# Patient Record
Sex: MALE | Race: WHITE | NOT HISPANIC OR LATINO | Employment: FULL TIME | URBAN - METROPOLITAN AREA
[De-identification: names, ages, dates, MRNs, and addresses within clinical notes are randomized per-mention and may not be internally consistent; named-entity substitution may affect disease eponyms.]

---

## 2018-01-13 ENCOUNTER — APPOINTMENT (OUTPATIENT)
Dept: RADIOLOGY | Facility: MEDICAL CENTER | Age: 23
End: 2018-01-13
Attending: EMERGENCY MEDICINE
Payer: MEDICAID

## 2018-01-13 ENCOUNTER — HOSPITAL ENCOUNTER (EMERGENCY)
Facility: MEDICAL CENTER | Age: 23
End: 2018-01-13
Attending: EMERGENCY MEDICINE
Payer: MEDICAID

## 2018-01-13 VITALS
HEART RATE: 98 BPM | OXYGEN SATURATION: 96 % | HEIGHT: 76 IN | RESPIRATION RATE: 17 BRPM | TEMPERATURE: 97.6 F | BODY MASS INDEX: 20.83 KG/M2 | WEIGHT: 171.08 LBS | SYSTOLIC BLOOD PRESSURE: 120 MMHG | DIASTOLIC BLOOD PRESSURE: 78 MMHG

## 2018-01-13 DIAGNOSIS — S62.339A CLOSED BOXER'S FRACTURE, INITIAL ENCOUNTER: ICD-10-CM

## 2018-01-13 PROCEDURE — 99284 EMERGENCY DEPT VISIT MOD MDM: CPT

## 2018-01-13 PROCEDURE — 73130 X-RAY EXAM OF HAND: CPT | Mod: LT

## 2018-01-13 PROCEDURE — 302874 HCHG BANDAGE ACE 2 OR 3""

## 2018-01-13 PROCEDURE — 29125 APPL SHORT ARM SPLINT STATIC: CPT

## 2018-01-13 ASSESSMENT — PAIN SCALES - GENERAL
PAINLEVEL_OUTOF10: 7
PAINLEVEL_OUTOF10: 8

## 2018-01-13 NOTE — ED NOTES
Pt to triage c/o left hand hand after hitting the wall. Swelling noted. Pt advised to return to triage nurse for any changes or concerns.

## 2018-01-14 NOTE — DISCHARGE INSTRUCTIONS
Boxer's Fracture  A boxer's fracture is a break (fracture) of the bone in your hand that connects your little finger to your wrist (fifth metacarpal). This type of fracture usually happens at the end of the bone, closest to the little finger. The knuckle is often pushed down by the impact.  In some cases, only a splint or brace is needed, or you may need a cast. Casting or splinting may include taping your injured finger to the next finger (vania taping). You may need surgery to repair the fracture. This may involve the use of wires, screws, or plates to hold the bone pieces in place.   CAUSES  This injury may be caused by:   · Hitting an object with a clenched fist.  · A hard, direct hit to the hand.  · An injury that crushes the hand.  RISK FACTORS  This injury is more likely to occur if:  · You are in a fistfight.  · You have certain bone diseases.  SYMPTOMS  Symptoms of this type of fracture develop soon after the injury. Symptoms may include:  · Swelling of the hand.  · Pain.  · Pain when moving the fifth finger or touching the hand.  · Abnormal position of the finger.  · Not being able to move the finger.  · A shortened finger.  · A finger knuckle that looks sunken in.  DIAGNOSIS  This injury may be diagnosed based on your symptoms, especially if you had a recent hand injury. Your health care provider will perform a physical exam, and you may also have X-rays to confirm the diagnosis.  TREATMENT   Treatment for this injury depends on how severe it is. Possible treatments include:  · Closed reduction. If your bone is stable and can be moved back into place, you may only need to wear a cast or splint or have vania taping.  · Open reduction with internal fixation (ORIF). This may be needed if your fracture is far out of place or goes through the joint surface of the bone. This treatment involves open surgery to move your bones back into the right position. Screws, wires, or plates may be used to stabilize the  fracture.  You may need to wear a cast or a splint for several weeks. You will also need to have follow-up X-rays to make sure that the bone is healing well and staying in position. After you no longer need the cast or splint, you may need physical therapy. This will help you to regain full movement and strength in your hand.   HOME CARE INSTRUCTIONS  If You Have a Cast:  · Do not stick anything inside the cast to scratch your skin. Doing that increases your risk of infection.  · Check the skin around the cast every day. Report any concerns to your health care provider. You may put lotion on dry skin around the edges of the cast. Do not apply lotion to the skin underneath the cast.  If You Have a Splint:  · Wear it as directed by your health care provider. Remove it only as directed by your health care provider.  · Loosen the splint if your fingers become numb and tingle, or if they turn cold and blue.  Bathing  · Cover the cast or splint with a watertight plastic bag to protect it from water while you take a bath or a shower. Do not let the cast or splint get wet.  Managing Pain, Stiffness, and Swelling  · If directed, apply ice to the injured area (if you have a splint, not a cast):  ¨ Put ice in a plastic bag.  ¨ Place a towel between your skin and the bag.  ¨ Leave the ice on for 20 minutes, 2-3 times a day.  · Move your fingers often to avoid stiffness and to lessen swelling.  · Raise the injured area above the level of your heart while you are sitting or lying down.  Driving  · Do not drive or operate heavy machinery while taking pain medicine.  · Do not drive while wearing a cast or splint on a hand or foot that you use for driving.  Activity  · Return to your normal activities as directed by your health care provider. Ask your health care provider what activities are safe for you.  General Instructions  · Do not put pressure on any part of the cast or splint until it is fully hardened. This may take several  hours.  · Keep the cast or splint clean and dry.  · Do not use any tobacco products, including cigarettes, chewing tobacco, or electronic cigarettes. Tobacco can delay bone healing. If you need help quitting, ask your health care provider.  · Take medicines only as directed by your health care provider.  · Keep all follow-up visits as directed by your health care provider. This is important.  SEEK MEDICAL CARE IF:  · Your pain is getting worse.  · You have redness, swelling, or pain in the injured area.  · You have fluid, blood, or pus coming from under your cast or splint.  · You notice a bad smell coming from under your cast or splint.  · You have a fever.  · Your cast or splint feels too tight or too loose.  · You cast is coming apart.  SEEK IMMEDIATE MEDICAL CARE IF:  · You develop a rash.  · You have trouble breathing.  · Your skin or nails on your injured hand turn blue or gray even after you loosen your splint.  · Your injured hand feels cold or becomes numb even after you loosen your splint.  · You develop severe pain under the cast or in your hand.     This information is not intended to replace advice given to you by your health care provider. Make sure you discuss any questions you have with your health care provider.     Document Released: 12/18/2006 Document Revised: 05/03/2016 Document Reviewed: 10/07/2015  [employer] Interactive Patient Education ©2016 [employer]      Cast or Splint Care  Casts and splints support injured limbs and keep bones from moving while they heal.   HOME CARE  · Keep the cast or splint uncovered during the drying period.  ¨ A plaster cast can take 24 to 48 hours to dry.  ¨ A fiberglass cast will dry in less than 1 hour.  · Do not rest the cast on anything harder than a pillow for 24 hours.  · Do not put weight on your injured limb. Do not put pressure on the cast. Wait for your doctor's approval.  · Keep the cast or splint dry.  ¨ Cover the cast or splint with a plastic bag  during baths or wet weather.  ¨ If you have a cast over your chest and belly (trunk), take sponge baths until the cast is taken off.  ¨ If your cast gets wet, dry it with a towel or blow dryer. Use the cool setting on the blow dryer.  · Keep your cast or splint clean. Wash a dirty cast with a damp cloth.  · Do not put any objects under your cast or splint.  · Do not scratch the skin under the cast with an object. If itching is a problem, use a blow dryer on a cool setting over the itchy area.  · Do not trim or cut your cast.  · Do not take out the padding from inside your cast.  · Exercise your joints near the cast as told by your doctor.  · Raise (elevate) your injured limb on 1 or 2 pillows for the first 1 to 3 days.  GET HELP IF:  · Your cast or splint cracks.  · Your cast or splint is too tight or too loose.  · You itch badly under the cast.  · Your cast gets wet or has a soft spot.  · You have a bad smell coming from the cast.  · You get an object stuck under the cast.  · Your skin around the cast becomes red or sore.  · You have new or more pain after the cast is put on.  GET HELP RIGHT AWAY IF:  · You have fluid leaking through the cast.  · You cannot move your fingers or toes.  · Your fingers or toes turn blue or white or are cool, painful, or puffy (swollen).  · You have tingling or lose feeling (numbness) around the injured area.  · You have bad pain or pressure under the cast.  · You have trouble breathing or have shortness of breath.  · You have chest pain.     This information is not intended to replace advice given to you by your health care provider. Make sure you discuss any questions you have with your health care provider.     Document Released: 04/18/2012 Document Revised: 08/20/2014 Document Reviewed: 06/26/2014  [employer] Interactive Patient Education ©2016 [employer]    Please follow-up with your primary care provider for blood pressure management.

## 2018-01-14 NOTE — ED PROVIDER NOTES
ED Provider Note    Scribed for Janine Parker M.D. by Yaritza Alfaro. 1/13/2018, 4:36 PM.    Primary care provider: Pcp Pt States None  Means of arrival: Walk-in  History obtained from: Patient  History limited by: None    CHIEF COMPLAINT  Chief Complaint   Patient presents with   • Hand Pain       HPI  Alex Nation is a 22 y.o. left-handed male who presents to the Emergency Department for evaluation of left hand pain in the context of punching a concrete wall onset today. He reports associated swelling to the hand and has difficulty making a fist. He denies sensory changes. Patient admits to previously injuring his left hand by punching things.      REVIEW OF SYSTEMS  Pertinent positives include left hand pain, swelling to L hand. Pertinent negatives include no sensory changes. All other systems reviewed and negative.     PAST MEDICAL HISTORY   has a past medical history of ADD (attention deficit disorder with hyperactivity); ADHD (attention deficit hyperactivity disorder); Bipolar 2 disorder (CMS-HCC); Bipolar affective (CMS-[facility]); Depression; Hypothyroid; Hypothyroidism; Schizophrenia (CMS-[facility]); and Suicidal attempted.    SURGICAL HISTORY  patient denies any surgical history    SOCIAL HISTORY  Social History   Substance Use Topics   • Smoking status: Current Every Day Smoker     Packs/day: 0.25     Years: 6.00     Types: Cigarettes   • Smokeless tobacco: Never Used      Comment: quit 2-3 years ago.    • Alcohol use Yes      Comment: occ      History   Drug Use   • Types: Inhaled     Comment: ocass. weed       FAMILY HISTORY  History reviewed. No pertinent family history.    CURRENT MEDICATIONS  Home Medications     Reviewed by Indiana Acosta R.N. (Registered Nurse) on 01/13/18 at 3054  Med List Status: Complete   Medication Last Dose Status        Patient Vince Taking any Medications                       ALLERGIES  No Known Allergies    PHYSICAL EXAM  VITAL SIGNS: /52   Pulse (!) 118   Temp 36.3 °C  "(97.4 °F) (Temporal)   Resp 16   Ht 1.93 m (6' 4\")   Wt 77.6 kg (171 lb 1.2 oz)   SpO2 96%   BMI 20.82 kg/m²     Constitutional:  Sitting upright in bed, well-appearing, shirtless, able to answer questions  HENT: Nose is normal in appearance without rhinorrhea, external ears are normal, moist mucous membranes  Eyes: Anicteric, pupils are equal round and reactive, there is no conjunctival drainage or pallor   Neck: The trachea is midline, there is no obvious mass or meningeal signs  Cardiovascular: Equal radial pulsation, tachycardic, regular rhythm without murmurs gallops or rubs  Thorax & Lungs: Respiratory rate and effort are normal. There is normal chest excursion with respiration. No wheezes rhonchi or rales noted.  Abdomen: Abdomen is normal in appearance, normal bowel sounds, no pain with cough, nonpulsatile  Musculoskeletal: Swelling on 3rd knuckle on L hand on dorsum, difficulty making fistm  Skin: Skin intact on L hand  Neurologic:  Cranial nerves II through XII are intact there is no focal abnormality noted.  Psychiatric: Normal mood and mentation    DIAGNOSTIC STUDIES / PROCEDURES  SPLINT PLACEMENT:  The patient was placed in a boxers splint and the splint was applied by tech. Following splint application I rechecked the position and circulation and neuro function. The splint was in good position with good neurovascular function. The left hand was well immobilized.    RADIOLOGY  DX-HAND 3+ LEFT   Final Result      No evidence of acute fracture or dislocation.        The radiologist's interpretation of all radiological studies have been reviewed by me.    COURSE & MEDICAL DECISION MAKING  Nursing notes and vital signs were reviewed. (See chart for details)  The patient's records were reviewed, history was obtained from the patient;     The patient presents with L hand pain and swelling, and the differential diagnosis includes but is not limited to fracture vs. contusion.    4:36 PM Initial orders in " the Emergency Department included DX Left Hand.    ED testing reveals nondisplaced fracture on X-Ray. Discussed the results with him and informed him that a splint will be placed and given a sling for comfort. I informed patient to follow up with Ortho which patient understands and agrees to. The patient will be discharged and should return if symptoms worsen or if new symptoms arise. The patient understands and agrees to plan.     The patient was discharged home with an information sheet on Boxer's Fracture and told to return immediately for any signs or symptoms listed, but specifically if pain worsens.  The patient agreed to the discharge precautions and follow-up plan which is documented in EPIC.    The patient is referred to a primary physician for blood pressure management, diabetic screening, and for all other preventative health concerns.    DISPOSITION:  Patient will be discharged home in stable condition.    FOLLOW UP:  Daniel Keith M.D.  [address]  [id number]  [phone]    Schedule an appointment as soon as possible for a visit in 3 days  ice, NSAIDS, splint, elevation      FINAL IMPRESSION  1. Closed boxer's fracture, initial encounter          IYaritza (Justa), am scribing for, and in the presence of, Janine Parker M.D..    Electronically signed by: Yaritza Dunn), 1/13/2018    Janine BARKSDALE M.D. personally performed the services described in this documentation, as scribed by Yaritza Alfaro in my presence, and it is both accurate and complete.    The note accurately reflects work and decisions made by me.  Janine Parker  1/13/2018  7:21 PM

## 2018-01-14 NOTE — ED NOTES
Discharge instructions given to patient, a verbal understanding of all instructions was stated. Pt preferred to walk out,  VSS, all belongings accounted for.

## 2019-03-02 ENCOUNTER — HOSPITAL ENCOUNTER (EMERGENCY)
Facility: MEDICAL CENTER | Age: 24
End: 2019-03-02
Attending: EMERGENCY MEDICINE
Payer: MEDICAID

## 2019-03-02 VITALS
HEART RATE: 69 BPM | HEIGHT: 77 IN | WEIGHT: 160.94 LBS | DIASTOLIC BLOOD PRESSURE: 76 MMHG | OXYGEN SATURATION: 98 % | RESPIRATION RATE: 16 BRPM | SYSTOLIC BLOOD PRESSURE: 131 MMHG | BODY MASS INDEX: 19 KG/M2 | TEMPERATURE: 98.4 F

## 2019-03-02 DIAGNOSIS — J04.10 TRACHEITIS: ICD-10-CM

## 2019-03-02 DIAGNOSIS — R49.0 HOARSENESS: ICD-10-CM

## 2019-03-02 DIAGNOSIS — J04.0 LARYNGITIS: ICD-10-CM

## 2019-03-02 PROCEDURE — 99283 EMERGENCY DEPT VISIT LOW MDM: CPT

## 2019-03-02 RX ORDER — AZITHROMYCIN 250 MG/1
TABLET, FILM COATED ORAL
Qty: 6 TAB | Refills: 0 | Status: SHIPPED | OUTPATIENT
Start: 2019-03-02 | End: 2019-09-11

## 2019-03-02 ASSESSMENT — ENCOUNTER SYMPTOMS
SORE THROAT: 1
HEMOPTYSIS: 1
COUGH: 1
HEADACHES: 1
SHORTNESS OF BREATH: 0
SPUTUM PRODUCTION: 1

## 2019-03-02 NOTE — ED TRIAGE NOTES
Pt amb to triage.  Chief Complaint   Patient presents with   • Hoarse     x3d     Pt states he was recently sick with the flu, he feels better but is concerned his voice is hoarse.

## 2019-03-02 NOTE — ED PROVIDER NOTES
ED Provider Note    Scribed for Lux Gomez M.D. by Param Gilbert. 3/2/2019, 9:37 AM.    Primary care provider: Pcp Pt States None  Means of arrival: Walk In  History obtained from: Patient  History limited by: None    CHIEF COMPLAINT  Chief Complaint   Patient presents with   • Hoarse     x3d       HPI  Alex Ntaion is a 23 y.o. male who presents to the Emergency Department for evaluation of flu like symptoms, which have been present for the last 6 days. He reports coughing, which is sometimes brown, but also sometimes is blood red. Additionally, he has rhinorrhea, sore throat, and headaches, but denies any shortness of breath. Alex's voice is also hoarse at this time, and he has pain with his coughing. He has tried various OTC medications, which have helped alleviate his headache and eased difficulty with sleep.    REVIEW OF SYSTEMS  Review of Systems   HENT: Positive for sore throat.         Positive: Rhinorrhea; Hoarse   Respiratory: Positive for cough, hemoptysis and sputum production. Negative for shortness of breath.    Neurological: Positive for headaches.       PAST MEDICAL HISTORY   has a past medical history of ADD (attention deficit disorder with hyperactivity); ADHD (attention deficit hyperactivity disorder); Bipolar 2 disorder (HCC); Bipolar affective (HCC); Depression; Hypothyroid; Hypothyroidism; Schizophrenia (HCC); and Suicidal attempted.    SURGICAL HISTORY  patient denies any surgical history    SOCIAL HISTORY  Social History   Substance Use Topics   • Smoking status: Current Every Day Smoker     Packs/day: 0.25     Years: 6.00     Types: Cigarettes   • Smokeless tobacco: Never Used      Comment: quit 2-3 years ago.    • Alcohol use Yes      Comment: occ      History   Drug Use   • Types: Inhaled     Comment: ocass. weed       FAMILY HISTORY  Family history reviewed. Family history not pertinent.    CURRENT MEDICATIONS  Home Medications     Reviewed by Luke Walters R.N. (Registered  "Nurse) on 03/02/19 at 0930  Med List Status: <None>   Medication Last Dose Status        Patient Vince Taking any Medications                       ALLERGIES  No Known Allergies    PHYSICAL EXAM  VITAL SIGNS: /76   Pulse 69   Temp 36.9 °C (98.4 °F) (Oral)   Resp 16   Ht 1.956 m (6' 5\")   Wt 73 kg (160 lb 15 oz)   SpO2 98%   BMI 19.08 kg/m²     Constitutional:  No acute distress  HENT: Normocephalic, Moist mucous membranes  Eyes: No conjunctivitis or icterus  Neck: trachea is midline, no palpable thyroid  Lymphatic: No cervical lymphadenopathy  Cardiovascular: Regular rate and rhythm, no murmurs  Thorax & Lungs: Normal breath sounds, no rhonchi  Abdomen: Soft, Non-tender  Skin:.No rash  Back: Non-tender, no CVA tenderness  Extremities:  No edema  Vascular: Symmetric radial pulse  Neurologic: Normal gross motor      COURSE & MEDICAL DECISION MAKING  Pertinent Labs & Imaging studies reviewed. (See chart for details)    9:46 AM - Patient seen and examined at bedside. Differential diagnoses include but not limited to: Laryngitis, Tracheitis. Informed the patient he likely has a viral infection which could be influenza, given his symptomology. Patient made aware that his symptoms may take up to another week to resolve given that they have already been present for 6 days. I will prescribe him antibiotics to treat the possibility of a bacterial infection, however he was made aware that they may not have any impact on his symptoms. He understands and is comfortable with discharge.    Medical Decision Making:   Patient has a hoarseness coughing up some brown occasionally bloody sputum and has pain in the tracheal region he may have a tracheitis I am covering him with a azithromycin.  He is given return precautions and follow-up    The patient will return for new or worsening symptoms and is stable at the time of discharge.    The patient is referred to a primary physician for blood pressure management, diabetic " screening, and for all other preventative health concerns.    DISPOSITION:  Patient will be discharged home in stable condition.    FOLLOW UP:  [address] [address]  [phone]          OUTPATIENT MEDICATIONS:  Discharge Medication List as of 3/2/2019 10:02 AM      START taking these medications    Details   azithromycin (ZITHROMAX) 250 MG Tab 2 po now then 1 po q day for 4 days, Disp-6 Tab, R-0, Print Rx Paper             FINAL IMPRESSION  1. Hoarseness    2. Laryngitis    3. Tracheitis          Param BARKSDALE (Scribe), am scribing for, and in the presence of, Lux Gomez M.D..    Electronically signed by: Param Gilbert (Scribe), 3/2/2019    Lux BARKSDALE M.D. personally performed the services described in this documentation, as scribed by Param Gilbert in my presence, and it is both accurate and complete. E.    The note accurately reflects work and decisions made by me.  Lux Gomez  3/2/2019  3:28 PM

## 2019-03-02 NOTE — ED NOTES
Pt verbalizes understanding of discharge instructions and education. Pt verbalizes understanding of antibiotic education. Pt to follow up with PCP. Steady and even gait noted.

## 2019-09-11 ENCOUNTER — HOSPITAL ENCOUNTER (OUTPATIENT)
Facility: MEDICAL CENTER | Age: 24
End: 2019-09-12
Attending: EMERGENCY MEDICINE | Admitting: HOSPITALIST

## 2019-09-11 DIAGNOSIS — R41.0 DELIRIUM: ICD-10-CM

## 2019-09-11 DIAGNOSIS — T50.901A ACCIDENTAL DRUG INGESTION, INITIAL ENCOUNTER: ICD-10-CM

## 2019-09-11 DIAGNOSIS — R00.0 TACHYCARDIA: ICD-10-CM

## 2019-09-11 PROBLEM — E87.6 HYPOKALEMIA: Status: ACTIVE | Noted: 2019-09-11

## 2019-09-11 PROBLEM — T50.902A PURPOSEFUL NON-SUICIDAL DRUG INGESTION (HCC): Status: ACTIVE | Noted: 2019-09-11

## 2019-09-11 LAB
ALBUMIN SERPL BCP-MCNC: 4.1 G/DL (ref 3.2–4.9)
ALBUMIN/GLOB SERPL: 1.3 G/DL
ALP SERPL-CCNC: 39 U/L (ref 30–99)
ALT SERPL-CCNC: 12 U/L (ref 2–50)
AMPHET UR QL SCN: NEGATIVE
ANION GAP SERPL CALC-SCNC: 11 MMOL/L (ref 0–11.9)
APAP SERPL-MCNC: <10 UG/ML (ref 10–30)
APPEARANCE UR: CLEAR
AST SERPL-CCNC: 17 U/L (ref 12–45)
BARBITURATES UR QL SCN: NEGATIVE
BASOPHILS # BLD AUTO: 0.5 % (ref 0–1.8)
BASOPHILS # BLD: 0.05 K/UL (ref 0–0.12)
BENZODIAZ UR QL SCN: POSITIVE
BILIRUB SERPL-MCNC: 0.4 MG/DL (ref 0.1–1.5)
BILIRUB UR QL STRIP.AUTO: NEGATIVE
BUN SERPL-MCNC: 13 MG/DL (ref 8–22)
BZE UR QL SCN: NEGATIVE
CALCIUM SERPL-MCNC: 8.9 MG/DL (ref 8.5–10.5)
CANNABINOIDS UR QL SCN: POSITIVE
CHLORIDE SERPL-SCNC: 106 MMOL/L (ref 96–112)
CO2 SERPL-SCNC: 22 MMOL/L (ref 20–33)
COLOR UR: YELLOW
CREAT SERPL-MCNC: 0.85 MG/DL (ref 0.5–1.4)
EOSINOPHIL # BLD AUTO: 0.15 K/UL (ref 0–0.51)
EOSINOPHIL NFR BLD: 1.6 % (ref 0–6.9)
ERYTHROCYTE [DISTWIDTH] IN BLOOD BY AUTOMATED COUNT: 40 FL (ref 35.9–50)
ETHANOL BLD-MCNC: 0 G/DL
GLOBULIN SER CALC-MCNC: 3.1 G/DL (ref 1.9–3.5)
GLUCOSE SERPL-MCNC: 127 MG/DL (ref 65–99)
GLUCOSE UR STRIP.AUTO-MCNC: NEGATIVE MG/DL
HCT VFR BLD AUTO: 40.9 % (ref 42–52)
HGB BLD-MCNC: 13.5 G/DL (ref 14–18)
IMM GRANULOCYTES # BLD AUTO: 0.1 K/UL (ref 0–0.11)
IMM GRANULOCYTES NFR BLD AUTO: 1.1 % (ref 0–0.9)
KETONES UR STRIP.AUTO-MCNC: NEGATIVE MG/DL
LEUKOCYTE ESTERASE UR QL STRIP.AUTO: NEGATIVE
LYMPHOCYTES # BLD AUTO: 2.81 K/UL (ref 1–4.8)
LYMPHOCYTES NFR BLD: 29.7 % (ref 22–41)
MAGNESIUM SERPL-MCNC: 1.6 MG/DL (ref 1.5–2.5)
MCH RBC QN AUTO: 30.1 PG (ref 27–33)
MCHC RBC AUTO-ENTMCNC: 33 G/DL (ref 33.7–35.3)
MCV RBC AUTO: 91.1 FL (ref 81.4–97.8)
METHADONE UR QL SCN: NEGATIVE
MICRO URNS: NORMAL
MONOCYTES # BLD AUTO: 0.53 K/UL (ref 0–0.85)
MONOCYTES NFR BLD AUTO: 5.6 % (ref 0–13.4)
NEUTROPHILS # BLD AUTO: 5.82 K/UL (ref 1.82–7.42)
NEUTROPHILS NFR BLD: 61.5 % (ref 44–72)
NITRITE UR QL STRIP.AUTO: NEGATIVE
NRBC # BLD AUTO: 0 K/UL
NRBC BLD-RTO: 0 /100 WBC
OPIATES UR QL SCN: NEGATIVE
OXYCODONE UR QL SCN: NEGATIVE
PCP UR QL SCN: NEGATIVE
PH UR STRIP.AUTO: 7.5 [PH] (ref 5–8)
PLATELET # BLD AUTO: 263 K/UL (ref 164–446)
PMV BLD AUTO: 10.4 FL (ref 9–12.9)
POTASSIUM SERPL-SCNC: 3.5 MMOL/L (ref 3.6–5.5)
PROPOXYPH UR QL SCN: NEGATIVE
PROT SERPL-MCNC: 7.2 G/DL (ref 6–8.2)
PROT UR QL STRIP: NEGATIVE MG/DL
RBC # BLD AUTO: 4.49 M/UL (ref 4.7–6.1)
RBC UR QL AUTO: NEGATIVE
SALICYLATES SERPL-MCNC: 0 MG/DL (ref 15–25)
SODIUM SERPL-SCNC: 139 MMOL/L (ref 135–145)
SP GR UR STRIP.AUTO: 1.02
TROPONIN T SERPL-MCNC: <6 NG/L (ref 6–19)
UROBILINOGEN UR STRIP.AUTO-MCNC: 0.2 MG/DL
WBC # BLD AUTO: 9.5 K/UL (ref 4.8–10.8)

## 2019-09-11 PROCEDURE — A9270 NON-COVERED ITEM OR SERVICE: HCPCS | Performed by: HOSPITALIST

## 2019-09-11 PROCEDURE — 96374 THER/PROPH/DIAG INJ IV PUSH: CPT

## 2019-09-11 PROCEDURE — 84484 ASSAY OF TROPONIN QUANT: CPT

## 2019-09-11 PROCEDURE — 700111 HCHG RX REV CODE 636 W/ 250 OVERRIDE (IP): Performed by: EMERGENCY MEDICINE

## 2019-09-11 PROCEDURE — 700105 HCHG RX REV CODE 258: Performed by: EMERGENCY MEDICINE

## 2019-09-11 PROCEDURE — 80053 COMPREHEN METABOLIC PANEL: CPT

## 2019-09-11 PROCEDURE — 700105 HCHG RX REV CODE 258: Performed by: HOSPITALIST

## 2019-09-11 PROCEDURE — 36415 COLL VENOUS BLD VENIPUNCTURE: CPT

## 2019-09-11 PROCEDURE — 85025 COMPLETE CBC W/AUTO DIFF WBC: CPT

## 2019-09-11 PROCEDURE — G0378 HOSPITAL OBSERVATION PER HR: HCPCS

## 2019-09-11 PROCEDURE — 83735 ASSAY OF MAGNESIUM: CPT

## 2019-09-11 PROCEDURE — 99219 PR INITIAL OBSERVATION CARE,LEVL II: CPT | Performed by: HOSPITALIST

## 2019-09-11 PROCEDURE — 81003 URINALYSIS AUTO W/O SCOPE: CPT

## 2019-09-11 PROCEDURE — 80307 DRUG TEST PRSMV CHEM ANLYZR: CPT

## 2019-09-11 PROCEDURE — 700102 HCHG RX REV CODE 250 W/ 637 OVERRIDE(OP): Performed by: HOSPITALIST

## 2019-09-11 PROCEDURE — 99285 EMERGENCY DEPT VISIT HI MDM: CPT

## 2019-09-11 RX ORDER — LORAZEPAM 2 MG/ML
1 INJECTION INTRAMUSCULAR ONCE
Status: COMPLETED | OUTPATIENT
Start: 2019-09-11 | End: 2019-09-11

## 2019-09-11 RX ORDER — LORAZEPAM 1 MG/1
1 TABLET ORAL ONCE
Status: DISCONTINUED | OUTPATIENT
Start: 2019-09-11 | End: 2019-09-11

## 2019-09-11 RX ORDER — PROMETHAZINE HYDROCHLORIDE 25 MG/1
12.5-25 SUPPOSITORY RECTAL EVERY 4 HOURS PRN
Status: DISCONTINUED | OUTPATIENT
Start: 2019-09-11 | End: 2019-09-12 | Stop reason: HOSPADM

## 2019-09-11 RX ORDER — PROCHLORPERAZINE EDISYLATE 5 MG/ML
5-10 INJECTION INTRAMUSCULAR; INTRAVENOUS EVERY 4 HOURS PRN
Status: DISCONTINUED | OUTPATIENT
Start: 2019-09-11 | End: 2019-09-12 | Stop reason: HOSPADM

## 2019-09-11 RX ORDER — POLYETHYLENE GLYCOL 3350 17 G/17G
1 POWDER, FOR SOLUTION ORAL
Status: DISCONTINUED | OUTPATIENT
Start: 2019-09-11 | End: 2019-09-12 | Stop reason: HOSPADM

## 2019-09-11 RX ORDER — PROMETHAZINE HYDROCHLORIDE 25 MG/1
12.5-25 TABLET ORAL EVERY 4 HOURS PRN
Status: DISCONTINUED | OUTPATIENT
Start: 2019-09-11 | End: 2019-09-12 | Stop reason: HOSPADM

## 2019-09-11 RX ORDER — SODIUM CHLORIDE 9 MG/ML
1000 INJECTION, SOLUTION INTRAVENOUS ONCE
Status: COMPLETED | OUTPATIENT
Start: 2019-09-11 | End: 2019-09-12

## 2019-09-11 RX ORDER — AMOXICILLIN 250 MG
2 CAPSULE ORAL 2 TIMES DAILY
Status: DISCONTINUED | OUTPATIENT
Start: 2019-09-11 | End: 2019-09-12 | Stop reason: HOSPADM

## 2019-09-11 RX ORDER — ONDANSETRON 4 MG/1
4 TABLET, ORALLY DISINTEGRATING ORAL EVERY 4 HOURS PRN
Status: DISCONTINUED | OUTPATIENT
Start: 2019-09-11 | End: 2019-09-12 | Stop reason: HOSPADM

## 2019-09-11 RX ORDER — SODIUM CHLORIDE, SODIUM LACTATE, POTASSIUM CHLORIDE, CALCIUM CHLORIDE 600; 310; 30; 20 MG/100ML; MG/100ML; MG/100ML; MG/100ML
INJECTION, SOLUTION INTRAVENOUS CONTINUOUS
Status: DISCONTINUED | OUTPATIENT
Start: 2019-09-11 | End: 2019-09-12 | Stop reason: HOSPADM

## 2019-09-11 RX ORDER — ONDANSETRON 2 MG/ML
4 INJECTION INTRAMUSCULAR; INTRAVENOUS EVERY 4 HOURS PRN
Status: DISCONTINUED | OUTPATIENT
Start: 2019-09-11 | End: 2019-09-12 | Stop reason: HOSPADM

## 2019-09-11 RX ORDER — POTASSIUM CHLORIDE 20 MEQ/1
40 TABLET, EXTENDED RELEASE ORAL ONCE
Status: COMPLETED | OUTPATIENT
Start: 2019-09-11 | End: 2019-09-11

## 2019-09-11 RX ORDER — ACETAMINOPHEN 325 MG/1
650 TABLET ORAL EVERY 6 HOURS PRN
Status: DISCONTINUED | OUTPATIENT
Start: 2019-09-11 | End: 2019-09-12 | Stop reason: HOSPADM

## 2019-09-11 RX ORDER — BISACODYL 10 MG
10 SUPPOSITORY, RECTAL RECTAL
Status: DISCONTINUED | OUTPATIENT
Start: 2019-09-11 | End: 2019-09-12 | Stop reason: HOSPADM

## 2019-09-11 RX ADMIN — POTASSIUM CHLORIDE 40 MEQ: 20 TABLET, EXTENDED RELEASE ORAL at 20:08

## 2019-09-11 RX ADMIN — SODIUM CHLORIDE, POTASSIUM CHLORIDE, SODIUM LACTATE AND CALCIUM CHLORIDE: 600; 310; 30; 20 INJECTION, SOLUTION INTRAVENOUS at 20:09

## 2019-09-11 RX ADMIN — SODIUM CHLORIDE 1000 ML: 9 INJECTION, SOLUTION INTRAVENOUS at 11:59

## 2019-09-11 RX ADMIN — LORAZEPAM 1 MG: 2 INJECTION INTRAMUSCULAR; INTRAVENOUS at 11:59

## 2019-09-11 SDOH — HEALTH STABILITY: MENTAL HEALTH: HOW OFTEN DO YOU HAVE A DRINK CONTAINING ALCOHOL?: MONTHLY OR LESS

## 2019-09-11 SDOH — HEALTH STABILITY: MENTAL HEALTH: HOW MANY STANDARD DRINKS CONTAINING ALCOHOL DO YOU HAVE ON A TYPICAL DAY?: 1 OR 2

## 2019-09-11 SDOH — HEALTH STABILITY: MENTAL HEALTH: HOW OFTEN DO YOU HAVE 6 OR MORE DRINKS ON ONE OCCASION?: NEVER

## 2019-09-11 ASSESSMENT — PATIENT HEALTH QUESTIONNAIRE - PHQ9
SUM OF ALL RESPONSES TO PHQ9 QUESTIONS 1 AND 2: 0
1. LITTLE INTEREST OR PLEASURE IN DOING THINGS: NOT AT ALL
2. FEELING DOWN, DEPRESSED, IRRITABLE, OR HOPELESS: NOT AT ALL

## 2019-09-11 ASSESSMENT — LIFESTYLE VARIABLES
AVERAGE NUMBER OF DAYS PER WEEK YOU HAVE A DRINK CONTAINING ALCOHOL: 1
EVER FELT BAD OR GUILTY ABOUT YOUR DRINKING: NO
DOES PATIENT WANT TO STOP DRINKING: NO
HOW MANY TIMES IN THE PAST YEAR HAVE YOU HAD 5 OR MORE DRINKS IN A DAY: 0
EVER_SMOKED: YES
TOTAL SCORE: 0
HAVE YOU EVER FELT YOU SHOULD CUT DOWN ON YOUR DRINKING: NO
ON A TYPICAL DAY WHEN YOU DRINK ALCOHOL HOW MANY DRINKS DO YOU HAVE: 0
HAVE PEOPLE ANNOYED YOU BY CRITICIZING YOUR DRINKING: NO
DO YOU DRINK ALCOHOL: NO
EVER_SMOKED: YES
ALCOHOL_USE: YES
EVER HAD A DRINK FIRST THING IN THE MORNING TO STEADY YOUR NERVES TO GET RID OF A HANGOVER: NO
CONSUMPTION TOTAL: NEGATIVE
TOTAL SCORE: 0
TOTAL SCORE: 0

## 2019-09-11 ASSESSMENT — COGNITIVE AND FUNCTIONAL STATUS - GENERAL
MOBILITY SCORE: 23
DAILY ACTIVITIY SCORE: 23
SUGGESTED CMS G CODE MODIFIER MOBILITY: CI
TOILETING: A LITTLE
WALKING IN HOSPITAL ROOM: A LITTLE
SUGGESTED CMS G CODE MODIFIER DAILY ACTIVITY: CI

## 2019-09-11 ASSESSMENT — ENCOUNTER SYMPTOMS
ABDOMINAL PAIN: 0
SHORTNESS OF BREATH: 0
DIZZINESS: 0
HALLUCINATIONS: 1
PALPITATIONS: 0

## 2019-09-11 NOTE — ED TRIAGE NOTES
Pt BIB EMS from outside his work.  Approx hour an a half ago pt ingested a scone that was made by a friend that was laced with something, reports that his friend normally does put marijuana but unsure of what is in it this time.  Pt reports that he ate the whole scone and only one of them.  Pt anxious and restless with hallucinations.  Pt was given 1mg of Versed and 4 mg of zofran by EMS.  ERP to see.

## 2019-09-11 NOTE — ED NOTES
Pt resting in gurney with eyes closed.  Pt wakes at times, stating random statements.  Pt re-oriented.  Call light in reach.  Will continue to monitor.

## 2019-09-11 NOTE — ED PROVIDER NOTES
ED Provider Note    Scribed for Sanjuanita Ruiz M.D. by Shahla Davidson. 9/11/2019, 11:45 AM.    Primary care provider: None noted  Means of arrival: Walk-in  History obtained from: Patient  History limited by: altered mental status    CHIEF COMPLAINT  Chief Complaint   Patient presents with   • Drug Ingestion   • Tachycardia       HPI  Alex Nation is a 24 y.o. male who presents to the Emergency Department for evaluation after drug ingestion onset earlier today. Per EMS, the patient received a scone made by a friend that the patient believes had marijuana. While at work, the patient began to feel disoriented, sleepy and nauseous. Associated symptoms of visual and auditory hallucinations. Denies suicidal ideation or other over the counter ingestion. Negative recreational drug use but states that he occasionally drinks alcohol. Past medical history includes bipolar disease and ADD.     Further HPI is limited secondary to altered mental status    REVIEW OF SYSTEMS  Pertinent positives include drug ingestion, disorientation, sleepiness, nausea, auditory and visual hallucinations. Pertinent negatives include no suicidal ideation, headache, vomiting.    Further ROS is limited secondary to altered mental status    PAST MEDICAL HISTORY   has a past medical history of ADD (attention deficit disorder with hyperactivity), ADHD (attention deficit hyperactivity disorder), Bipolar 2 disorder (HCC), Bipolar affective (HCC), Depression, Hypothyroid, Hypothyroidism, Schizophrenia (HCC), and Suicidal attempted.    SURGICAL HISTORY  patient denies any surgical history    SOCIAL HISTORY  Social History     Tobacco Use   • Smoking status: Current Some Day Smoker     Packs/day: 0.25     Years: 6.00     Pack years: 1.50     Types: Cigarettes   • Smokeless tobacco: Never Used   Substance Use Topics   • Alcohol use: Yes     Comment: occ   • Drug use: Yes     Types: Inhaled     Comment: ocass. weed      Social History     Substance and Sexual  "Activity   Drug Use Yes   • Types: Inhaled    Comment: ocass. weed       FAMILY HISTORY  History reviewed. No pertinent family history.    CURRENT MEDICATIONS  The patient denies taking any daily medications    ALLERGIES  No Known Allergies    PHYSICAL EXAM  VITAL SIGNS: /54   Pulse (!) 135   Temp 38 °C (100.4 °F) (Temporal)   Resp 20   Ht 1.93 m (6' 4\")   Wt 84.8 kg (187 lb)   SpO2 95%   BMI 22.76 kg/m²     Constitutional: Altered appearing  male. Able to answer some questions. Hallucinating. Restless in bed. Well developed  HENT: Normocephalic, No evidence of head trauma, Bilateral external ears normal, Oropharynx moist, No oral exudates, Nose normal.   Eyes: PERRL, EOMI, Conjunctiva normal, No discharge. Pupils are 3 mm and symmetrical bilateral  Neck: Normal range of motion, No tenderness, Supple, No stridor. No meningeal signs  Cardiovascular: Tachycardic heart rate, Normal rhythm  Thorax & Lungs: Normal breath sounds, No respiratory distress   Abdomen: Benign abdominal exam, no guarding no rebound, no tenderness, no distention  Skin: Warm, Dry, No erythema, No rash.   Back: No tenderness, No CVA tenderness.   Extremities: Intact distal pulses, No edema, No tenderness   Neurologic:  altered but can answer questions appropriately. Moves all extremities. No facial asymmetry. Normal sensory function.   Psychiatric: Hallucinating. Restless in bed. Gestures and shakes with left hand                                       DIAGNOSTIC STUDIES / PROCEDURES\    LABS  Results for orders placed or performed during the hospital encounter of 09/11/19   CBC WITH DIFFERENTIAL   Result Value Ref Range    WBC 9.5 4.8 - 10.8 K/uL    RBC 4.49 (L) 4.70 - 6.10 M/uL    Hemoglobin 13.5 (L) 14.0 - 18.0 g/dL    Hematocrit 40.9 (L) 42.0 - 52.0 %    MCV 91.1 81.4 - 97.8 fL    MCH 30.1 27.0 - 33.0 pg    MCHC 33.0 (L) 33.7 - 35.3 g/dL    RDW 40.0 35.9 - 50.0 fL    Platelet Count 263 164 - 446 K/uL    MPV 10.4 9.0 - 12.9 " fL    Neutrophils-Polys 61.50 44.00 - 72.00 %    Lymphocytes 29.70 22.00 - 41.00 %    Monocytes 5.60 0.00 - 13.40 %    Eosinophils 1.60 0.00 - 6.90 %    Basophils 0.50 0.00 - 1.80 %    Immature Granulocytes 1.10 (H) 0.00 - 0.90 %    Nucleated RBC 0.00 /100 WBC    Neutrophils (Absolute) 5.82 1.82 - 7.42 K/uL    Lymphs (Absolute) 2.81 1.00 - 4.80 K/uL    Monos (Absolute) 0.53 0.00 - 0.85 K/uL    Eos (Absolute) 0.15 0.00 - 0.51 K/uL    Baso (Absolute) 0.05 0.00 - 0.12 K/uL    Immature Granulocytes (abs) 0.10 0.00 - 0.11 K/uL    NRBC (Absolute) 0.00 K/uL   TROPONIN   Result Value Ref Range    Troponin T <6 6 - 19 ng/L       All labs reviewed by me.    COURSE & MEDICAL DECISION MAKING  Nursing notes, VS, PMSFHx reviewed in chart.     Patient presents to the emergency department with altered mental status.  EMS reports patient ate a scone and began being altered after eating this gone.  Patient denies feeling ill yesterday.  History is somewhat limited as he is difficult to keep on task.  He is able to answer some questions and then goes off on tangents about his hallucinations at other times.  He states he is seeing and hearing things.  Patient does not complain of any pain.  He is tachycardic here.  He does have some mild tremors and is difficult to get him to sit still.  There is no evidence of head trauma.  Temperature upon arrival was 100.4.  This is a temporal temperature.  He does not have any meningeal signs.  I think it would be unusual for meningitis to occur so quickly as he was normal when he first arrived at work a few hours ago.  Patient denies any any history of alcohol abuse and I do not suspect DTs at this time.  Review of the old chart does not show evidence of meth abuse in the past.  I do not see any evidence of head trauma states just his altered mental status is due to an intracranial process.    11:45 AM Patient seen and examined at bedside. The patient presents with drug ingestion and the  differential diagnosis includes but is not limited to dehydration, drug ingestion, electrolyte abnormality. There are no physical exam findings to suggest trauma. There is no history of alcohol abuse to suggest DTs. Ordered for Troponin, UA, Salicylate level, Acetaminophen, CMP, CBC with differential, blood alcohol, urine drug screen to evaluate. Patient was treated with Ativan 1 mg and IV fluids 1,000 mL for his symptoms.    Patient's laboratory studies show a normal white count without evidence of bandemia.  Patient does not have evidence of a gap acidosis.  CO2 is 22.  There is no evidence of significant dehydration.  Tylenol and aspirin levels were negative.  Alcohol level was also 0.  Urine is still pending at this time.  He received a liter of fluid and Ativan.  This did help his heart rate as it is now in the 80s.  He is also less tremulous and resting comfortably.  It does not appear that the patient is currently taking any medications.  However he does have a history of psychiatric issues.  And therefore serotonin syndrome is also on the differential.  He does seem to have a slight tremor on exam but I do not feel any rigidity.    HYDRATION: Based on the patient's presentation of drug ingestion and altered mental status the patient was given IV fluids. IV Hydration was used because oral hydration was not adequate alone. Upon recheck following hydration, the patient was improved..    Patient's temperature is normal.  He does seem to more clear in his thinking.  However he still remains altered and I believe he requires admission for further evaluation and monitoring.  Urine is still pending at this time.  Patient was able to give a sample however lab is down at this time.  He is less tremulous at this time.  He does not complain of any headache.    I discussed the case with Dr. Gonzalez who will admit the patient.    FINAL IMPRESSION  1. Accidental drug ingestion, initial encounter    2. Tachycardia    3.  Delirium          APOLONIA, Shahla Davidson (Scribe), am scribing for, and in the presence of, Sanjuanita Ruiz M.D..    Electronically signed by: Shahla Davidson ([email or web address]), 9/11/2019    ISanjuanita M.D. personally performed the services described in this documentation, as scribed by Shahla Davidson in my presence, and it is both accurate and complete.    C    The note accurately reflects work and decisions made by me.  Sanjuanita Ruiz  9/11/2019  3:31 PM

## 2019-09-12 VITALS
TEMPERATURE: 98.7 F | WEIGHT: 187 LBS | HEART RATE: 64 BPM | SYSTOLIC BLOOD PRESSURE: 104 MMHG | BODY MASS INDEX: 22.77 KG/M2 | DIASTOLIC BLOOD PRESSURE: 57 MMHG | HEIGHT: 76 IN | RESPIRATION RATE: 17 BRPM | OXYGEN SATURATION: 96 %

## 2019-09-12 PROBLEM — E87.6 HYPOKALEMIA: Status: RESOLVED | Noted: 2019-09-11 | Resolved: 2019-09-12

## 2019-09-12 PROCEDURE — 99217 PR OBSERVATION CARE DISCHARGE: CPT | Performed by: INTERNAL MEDICINE

## 2019-09-12 PROCEDURE — G0378 HOSPITAL OBSERVATION PER HR: HCPCS

## 2019-09-12 PROCEDURE — 700111 HCHG RX REV CODE 636 W/ 250 OVERRIDE (IP): Performed by: HOSPITALIST

## 2019-09-12 PROCEDURE — 90686 IIV4 VACC NO PRSV 0.5 ML IM: CPT | Performed by: INTERNAL MEDICINE

## 2019-09-12 PROCEDURE — 700111 HCHG RX REV CODE 636 W/ 250 OVERRIDE (IP): Performed by: INTERNAL MEDICINE

## 2019-09-12 PROCEDURE — 700102 HCHG RX REV CODE 250 W/ 637 OVERRIDE(OP): Performed by: HOSPITALIST

## 2019-09-12 PROCEDURE — 700105 HCHG RX REV CODE 258: Performed by: HOSPITALIST

## 2019-09-12 PROCEDURE — 96372 THER/PROPH/DIAG INJ SC/IM: CPT

## 2019-09-12 PROCEDURE — A9270 NON-COVERED ITEM OR SERVICE: HCPCS | Performed by: HOSPITALIST

## 2019-09-12 PROCEDURE — 90471 IMMUNIZATION ADMIN: CPT

## 2019-09-12 RX ADMIN — INFLUENZA A VIRUS A/BRISBANE/02/2018 IVR-190 (H1N1) ANTIGEN (FORMALDEHYDE INACTIVATED), INFLUENZA A VIRUS A/KANSAS/14/2017 X-327 (H3N2) ANTIGEN (FORMALDEHYDE INACTIVATED), INFLUENZA B VIRUS B/PHUKET/3073/2013 ANTIGEN (FORMALDEHYDE INACTIVATED), AND INFLUENZA B VIRUS B/MARYLAND/15/2016 BX-69A ANTIGEN (FORMALDEHYDE INACTIVATED) 0.5 ML: 15; 15; 15; 15 INJECTION, SUSPENSION INTRAMUSCULAR at 16:10

## 2019-09-12 RX ADMIN — SODIUM CHLORIDE, POTASSIUM CHLORIDE, SODIUM LACTATE AND CALCIUM CHLORIDE: 600; 310; 30; 20 INJECTION, SOLUTION INTRAVENOUS at 04:54

## 2019-09-12 RX ADMIN — ENOXAPARIN SODIUM 40 MG: 100 INJECTION SUBCUTANEOUS at 04:55

## 2019-09-12 RX ADMIN — ACETAMINOPHEN 650 MG: 325 TABLET, FILM COATED ORAL at 09:39

## 2019-09-12 ASSESSMENT — PATIENT HEALTH QUESTIONNAIRE - PHQ9
2. FEELING DOWN, DEPRESSED, IRRITABLE, OR HOPELESS: NOT AT ALL
SUM OF ALL RESPONSES TO PHQ9 QUESTIONS 1 AND 2: 0
1. LITTLE INTEREST OR PLEASURE IN DOING THINGS: NOT AT ALL

## 2019-09-12 NOTE — ED NOTES
Pt resting in [facility] requesting food, pt informed awaiting for orders from admitting MD.  Pt has no complaints at this time and watching TV.  Pt also spoke to girlfriend on phone.

## 2019-09-12 NOTE — CARE PLAN
Problem: Communication  Goal: The ability to communicate needs accurately and effectively will improve  Outcome: MET     Problem: Safety  Goal: Will remain free from injury  Outcome: MET  Goal: Will remain free from falls  Outcome: MET     Problem: Infection  Goal: Will remain free from infection  Outcome: MET     Problem: Venous Thromboembolism (VTW)/Deep Vein Thrombosis (DVT) Prevention:  Goal: Patient will participate in Venous Thrombosis (VTE)/Deep Vein Thrombosis (DVT)Prevention Measures  Outcome: MET     Problem: Bowel/Gastric:  Goal: Normal bowel function is maintained or improved  Outcome: MET  Goal: Will not experience complications related to bowel motility  Outcome: MET     Problem: Knowledge Deficit  Goal: Knowledge of disease process/condition, treatment plan, diagnostic tests, and medications will improve  Outcome: MET  Goal: Knowledge of the prescribed therapeutic regimen will improve  Outcome: MET     Problem: Discharge Barriers/Planning  Goal: Patient's continuum of care needs will be met  Outcome: MET     Problem: Psychosocial Needs:  Goal: Level of anxiety will decrease  Outcome: MET     Problem: Medication  Goal: Compliance with prescribed medication will improve  Outcome: MET     Problem: Safety  Goal: Free from accidental injury  Outcome: MET  Goal: Free from intentional harm  Outcome: MET  Goal: Free from self harm  Outcome: MET  Goal: Isolation Precautions for patient and staff safety  Outcome: MET     Problem: Knowledge Deficit  Goal: Patient/Significant other demonstrates understanding of disease process, treatment plan, medications and discharge instructions  Outcome: MET     Problem: Psychosocial needs  Goal: Spiritual needs incorporated in hospitalization  Outcome: MET  Goal: Cultural needs incorporated in hospitalization  Outcome: MET  Goal: Anxiety reduction  Outcome: MET     Problem: Discharge Barriers/Planning  Goal: Patient's Continuum of care needs are met  Outcome: MET      Problem: Skin Integrity  Goal: Skin Integrity is maintained or improved  Outcome: MET     Problem: Risk for Impaired Mobility  Goal: Mobilize and/or Transfer Safely with [facility]  Outcome: MET  Goal: Activity Level appropriate for Discharge or Transfer  Outcome: MET     Problem: Oxygenation/Respiratory Function  Goal: Patient will Achieve/Maintain Normal Respiratory Rate/Effort  Outcome: MET     Problem: Pain  Goal: Alleviation of Pain or a reduction in pain to the patient's comfort goal  Outcome: MET     Problem: Elimination  Goal: Establishment and Maintenance of regular bowel elimination  Outcome: MET  Goal: Regular urinary elimination  Outcome: MET     Problem: Pain Management  Goal: Pain level will decrease to patient's comfort goal  Outcome: MET

## 2019-09-12 NOTE — DISCHARGE SUMMARY
Discharge Summary    CHIEF COMPLAINT ON ADMISSION  Chief Complaint   Patient presents with   • Drug Ingestion   • Tachycardia       Reason for Admission  EMS     Admission Date  9/11/2019    CODE STATUS  Full Code    HPI & HOSPITAL COURSE  This is a 24 y.o. male here with PMH ADHD, bipolar, depression, schizophrenia, h/o suicide attempt who ingested scone that he says didn't know had marijuana who became acutely confused with hallucinations. Labs were grossly normal with negative tylenol, salicylate, and alcohol levels. Urine drug screen was positive for cannabinoid and opioids. He was hydrated in the ED and monitored with improvement but remained confused so admitted overnight. He improved the next day. I discussed his urine drug screen with him, he denies knowingly ingesting any drugs, he didn't know what was in the scone. He mentions he was closely followed by psychiatry in the past, but none recently and denies and recent issues with his mental health. He says if he has any hallucinations, it's very brief. I provided him with a work note.       Therefore, he is discharged in good and stable condition to home with close outpatient follow-up.      Discharge Date  9/12    FOLLOW UP ITEMS POST DISCHARGE  F/u with psychiatry    DISCHARGE DIAGNOSES  Principal Problem:    Purposeful non-suicidal drug ingestion (HCC) POA: Yes  Resolved Problems:    Hypokalemia POA: Unknown      FOLLOW UP  No follow-up provider specified.    MEDICATIONS ON DISCHARGE     Medication List      You have not been prescribed any medications.         Allergies  No Known Allergies    DIET  Orders Placed This Encounter   Procedures   • Diet Order Regular     Standing Status:   Standing     Number of Occurrences:   1     Order Specific Question:   Diet:     Answer:   Regular [1]   • Discontinue Diet Tray     Standing Status:   Standing     Number of Occurrences:   1       ACTIVITY  As tolerated.  Weight bearing as  tolerated    CONSULTATIONS  None    PROCEDURES  No orders to display         LABORATORY  Lab Results   Component Value Date    SODIUM 139 09/11/2019    POTASSIUM 3.5 (L) 09/11/2019    CHLORIDE 106 09/11/2019    CO2 22 09/11/2019    GLUCOSE 127 (H) 09/11/2019    BUN 13 09/11/2019    CREATININE 0.85 09/11/2019        Lab Results   Component Value Date    WBC 9.5 09/11/2019    HEMOGLOBIN 13.5 (L) 09/11/2019    HEMATOCRIT 40.9 (L) 09/11/2019    PLATELETCT 263 09/11/2019        Total time of the discharge process exceeds 32 minutes.

## 2019-09-12 NOTE — H&P
"Hospital Medicine History & Physical Note    Date of Service  9/11/2019    Primary Care Physician  Pcp Pt States None    Consultants  None    Code Status  Full    Chief Complaint  Hallucinations    History of Presenting Illness  24 y.o. male with a prior history of hypothyroid and psychiatric history per chart who presented 9/11/2019 with altered mental status with hallucinations after ingesting a scone that was laced with \"marijuana\" unsure what else.  He has had similar items before.  Is been anxious, restless and having hallucinations.  He was given Versed and Zofran by emergency personnel.  Patient works in a warehouse type facility and after ingesting this he was brought in to the hospital.  Patient currently is awake but has an odd affect he did state he has some hallucinations of \"dreams that have yet to become real\" he is a poor historian.  He was aware he was at [facility].  He is moving all extremities.    Review of Systems  Review of Systems   Respiratory: Negative for shortness of breath.    Cardiovascular: Negative for palpitations.   Gastrointestinal: Negative for abdominal pain.   Neurological: Negative for dizziness.   Psychiatric/Behavioral: Positive for hallucinations.       Past Medical History   has a past medical history of ADD (attention deficit disorder with hyperactivity), ADHD (attention deficit hyperactivity disorder), Bipolar 2 disorder (HCC), Bipolar affective (HCC), Depression, Hypothyroid, Hypothyroidism, Schizophrenia (HCC), and Suicidal attempted.    Surgical History   has no past surgical history on file.  He denied any surgical history    Family History  family history is not on file.  States parents are alive    Social History   reports that he has been smoking cigarettes. He has a 1.50 pack-year smoking history. He has never used smokeless tobacco. He reports that he drinks alcohol. He reports that he has current or past drug history. Drug: Inhaled.  States he has a \"street " "child\" states he rarely drinks any alcohol    Allergies  No Known Allergies    Medications  None       Physical Exam  Temp:  [37 °C (98.6 °F)-38 °C (100.4 °F)] 37.1 °C (98.8 °F)  Pulse:  [] 79  Resp:  [16-20] 18  BP: (103-120)/(49-63) 107/49  SpO2:  [95 %-100 %] 98 %    Physical Exam   Constitutional: He appears well-developed. No distress.   HENT:   Head: Normocephalic and atraumatic.   Nose: Nose normal.   Mouth/Throat: No oropharyngeal exudate.   Eyes: Conjunctivae are normal. Right eye exhibits no discharge. Left eye exhibits no discharge. No scleral icterus.   Neck: No tracheal deviation present.   Cardiovascular: Normal rate, regular rhythm and normal heart sounds.   No murmur heard.  Pulses:       Radial pulses are 2+ on the right side, and 2+ on the left side.        Dorsalis pedis pulses are 2+ on the right side, and 2+ on the left side.   Pulmonary/Chest: Effort normal and breath sounds normal. No respiratory distress. He has no wheezes.   Abdominal: Soft. Bowel sounds are normal. He exhibits no distension. There is no tenderness. There is no rebound.   Musculoskeletal: He exhibits no edema.   Lymphadenopathy:     He has no cervical adenopathy.   Neurological: He is alert. He displays tremor. No cranial nerve deficit. Coordination abnormal.   Skin: Skin is warm and dry. He is not diaphoretic.   Psychiatric: His speech is delayed. He is actively hallucinating. Thought content is delusional. Cognition and memory are impaired. He expresses inappropriate judgment.   Vitals reviewed.      Laboratory:  Recent Labs     09/11/19  1145   WBC 9.5   RBC 4.49*   HEMOGLOBIN 13.5*   HEMATOCRIT 40.9*   MCV 91.1   MCH 30.1   MCHC 33.0*   RDW 40.0   PLATELETCT 263   MPV 10.4     Recent Labs     09/11/19  1145   SODIUM 139   POTASSIUM 3.5*   CHLORIDE 106   CO2 22   GLUCOSE 127*   BUN 13   CREATININE 0.85   CALCIUM 8.9     Recent Labs     09/11/19  1145   ALTSGPT 12   ASTSGOT 17   ALKPHOSPHAT 39   TBILIRUBIN 0.4 "   GLUCOSE 127*         No results for input(s): NTPROBNP in the last 72 hours.      Recent Labs     09/11/19  1145   TROPONINT <6       Urinalysis:    Recent Labs     09/11/19  1410   SPECGRAVITY 1.022   GLUCOSEUR Negative   KETONES Negative   NITRITE Negative   LEUKESTERAS Negative        Imaging:  No orders to display         Assessment/Plan:  I anticipate this patient is appropriate for observation status at this time.    * Purposeful non-suicidal drug ingestion (HCC)  Assessment & Plan  Reportedly ate a scone but was laced with marijuana  Acting quite different than just marijuana alone.  States she is having hallucinations.  Drug screen positive for benzodiazepines and cannabinoids  Keep calm and relaxed.  Ativan if needed  Urine analysis was unremarkable  Check thyroid studies as per notes he had a hypothyroid history however is not on any medications  History of psych history with ADHD, bipolar and schizophrenia however is not on any medications    Hypokalemia  Assessment & Plan  Supplement  Check magnesium level        VTE prophylaxis: SCDs

## 2019-09-12 NOTE — PROGRESS NOTES
"Patient drowsy appearing with droopy eyes bilat. Asked pt if he was tired states \"yes\". Verbalizes concern that \"I may go to sleep and not wake up\". RN asked pt to explain further. Pt verbalizes fear of side effect from ingested foreign substance. Reassured patient that staff are frequently checking on him to ensure his safety.     1000:patient resting with eye mask in place. RR even and unlabored. positioning self without difficulty. Bed alarm in place. SCD's in place bilat. Non slip footwear in place.     1100: continues to rest with eyes closed and easy respirations    1155: no changes to condition. Continues to sleep.   "

## 2019-09-12 NOTE — ASSESSMENT & PLAN NOTE
Reportedly ate a scone but was laced with marijuana  Acting quite different than just marijuana alone.  States she is having hallucinations.  Drug screen positive for benzodiazepines and cannabinoids  Keep calm and relaxed.  Ativan if needed  Urine analysis was unremarkable  Check thyroid studies as per notes he had a hypothyroid history however is not on any medications  History of psych history with ADHD, bipolar and schizophrenia however is not on any medications

## 2019-09-12 NOTE — ED NOTES
Rounded on patient, patient resting comfortably in gurney at this time, assisted up to bathroom, updated on poc, denies needs at this time.

## 2019-09-12 NOTE — DISCHARGE INSTRUCTIONS
Discharge Instructions    Discharged to home by car with friend. Discharged via walking, hospital escort: Refused.  Special equipment needed: Not Applicable    Be sure to schedule a follow-up appointment with your primary care doctor or any specialists as instructed.     Discharge Plan:   Diet Plan: Discussed  Activity Level: Discussed  Smoking Cessation Offered: Patient Refused  Confirmed Follow up Appointment: No (Comments)  Confirmed Symptoms Management: Discussed  Medication Reconciliation Updated: No (Comments)  Influenza Vaccine Indication: Indicated: 9 to 64 years of age    I understand that a diet low in cholesterol, fat, and sodium is recommended for good health. Unless I have been given specific instructions below for another diet, I accept this instruction as my diet prescription.   Other diet: Regular    Special Instructions: None    · Is patient discharged on Warfarin / Coumadin?   No     Depression / Suicide Risk    As you are discharged from this [facility] facility, it is important to learn how to keep safe from harming yourself.    Recognize the warning signs:  · Abrupt changes in personality, positive or negative- including increase in energy   · Giving away possessions  · Change in eating patterns- significant weight changes-  positive or negative  · Change in sleeping patterns- unable to sleep or sleeping all the time   · Unwillingness or inability to communicate  · Depression  · Unusual sadness, discouragement and loneliness  · Talk of wanting to die  · Neglect of personal appearance   · Rebelliousness- reckless behavior  · Withdrawal from people/activities they love  · Confusion- inability to concentrate     If you or a loved one observes any of these behaviors or has concerns about self-harm, here's what you can do:  · Talk about it- your feelings and reasons for harming yourself  · Remove any means that you might use to hurt yourself (examples: pills, rope, extension cords, firearm)  · Get  professional help from the community (Mental Health, Substance Abuse, psychological counseling)  · Do not be alone:Call your Safe Contact- someone whom you trust who will be there for you.  · Call your local CRISIS HOTLINE 405-8355 or 128-141-2071  · Call your local Children's Mobile Crisis Response Team [place] (343) 984-7971 or www.[employer]  · Call the toll free National Suicide Prevention Hotlines   · National Suicide Prevention Lifeline 111-767-BCBK (7208)  · [employer] Line Network 800-SUICIDE (839-8092)      Discharge Instructions per Norma Sanchez M.D.    Your urine drug screen was positive for marijuana and benzodiazepines.  Avoid further ingestion of unprescribed drugs and hallucinations. If you develop worsening hallucinations, I recommend you follow up with psychiatry.

## 2019-09-12 NOTE — DISCHARGE PLANNING
Care Transition Team Assessment    Patient does not have insurance, stating he let his Medicaid lapse. His current provider offers insurance, but has to pay for it.  Referral made to [facility] for Medicaid Screening, unsure if he will qualify due to his income.     Information Source  Orientation : Oriented x 4  Information Given By: Patient  Who is responsible for making decisions for patient? : Patient    Readmission Evaluation  Is this a readmission?: No    Elopement Risk  Legal Hold: No  Ambulatory or Self Mobile in Wheelchair: No-Not an Elopement Risk  Elopement Risk: Not at Risk for Elopement    Interdisciplinary Discharge Planning  Does Admitting Nurse Feel This Could be a Complex Discharge?: No  Lives with - Patient's Self Care Capacity: Unable To Determine At This Time  Patient or legal guardian wants to designate a caregiver (see row info): No  Support Systems: Family Member(s)  Housing / Facility: Unable To Determine At This Time  Do You Take your Prescribed Medications Regularly: No  Able to Return to Previous ADL's: Yes  Mobility Issues: No  Prior Services: None  Durable Medical Equipment: Not Applicable    Discharge Preparedness  What is your plan after discharge?: Uncertain - pending medical team collaboration  What are your discharge supports?: Partner  Prior Functional Level: Ambulatory, Drives Self, Independent with Activities of Daily Living, Independent with Medication Management  Difficulity with ADLs: None  Difficulity with IADLs: None    Functional Assesment  Prior Functional Level: Ambulatory, Drives Self, Independent with Activities of Daily Living, Independent with Medication Management    Finances  Financial Barriers to Discharge: No  Prescription Coverage: No  Prescription Coverage Comments: (referral made to [facility])    Vision / Hearing Impairment  Vision Impairment : No  Right Eye Vision: Wears Glasses  Left Eye Vision: Wears Glasses  Hearing Impairment : No         Advance Directive  Advance  Directive?: None  Advance Directive offered?: AD Booklet refused    Domestic Abuse  Have you ever been the victim of abuse or violence?: No  Physical Abuse or Sexual Abuse: No  Verbal Abuse or Emotional Abuse: No  Possible Abuse Reported to:: Not Applicable    Psychological Assessment  History of Substance Abuse: Marijuana  Date Last Used - Marijuana: (Yesterday)  Substance Abuse Comments: (states he hadn't smoke any in over 7 months)  History of Psychiatric Problems: No  Non-compliant with Treatment: No  Newly Diagnosed Illness: No    Discharge Risks or Barriers  Discharge risks or barriers?: No PCP, Uninsured / underinsured  Patient risk factors: No PCP, Uninsured or underinsured    Anticipated Discharge Information  Anticipated discharge disposition: Home  Discharge Address: (face sheet)

## 2020-07-27 ENCOUNTER — HOSPITAL ENCOUNTER (EMERGENCY)
Dept: HOSPITAL 8 - ED | Age: 25
Discharge: HOME | End: 2020-07-27
Payer: MEDICAID

## 2020-07-27 VITALS — SYSTOLIC BLOOD PRESSURE: 136 MMHG | DIASTOLIC BLOOD PRESSURE: 63 MMHG

## 2020-07-27 VITALS — HEIGHT: 77 IN | BODY MASS INDEX: 22.33 KG/M2 | WEIGHT: 189.16 LBS

## 2020-07-27 DIAGNOSIS — Z72.9: ICD-10-CM

## 2020-07-27 DIAGNOSIS — F17.210: ICD-10-CM

## 2020-07-27 DIAGNOSIS — R51: ICD-10-CM

## 2020-07-27 DIAGNOSIS — K02.9: Primary | ICD-10-CM

## 2020-07-27 PROCEDURE — 64400 NJX AA&/STRD TRIGEMINAL NRV: CPT

## 2020-07-27 PROCEDURE — 99406 BEHAV CHNG SMOKING 3-10 MIN: CPT

## 2020-07-27 PROCEDURE — 99284 EMERGENCY DEPT VISIT MOD MDM: CPT

## 2020-10-13 ENCOUNTER — HOSPITAL ENCOUNTER (EMERGENCY)
Dept: HOSPITAL 8 - ED | Age: 25
End: 2020-10-13
Payer: COMMERCIAL

## 2020-10-13 VITALS — HEIGHT: 76 IN | WEIGHT: 190.48 LBS | BODY MASS INDEX: 23.2 KG/M2

## 2020-10-13 VITALS — DIASTOLIC BLOOD PRESSURE: 72 MMHG | SYSTOLIC BLOOD PRESSURE: 125 MMHG

## 2020-10-13 DIAGNOSIS — K02.9: ICD-10-CM

## 2020-10-13 DIAGNOSIS — K04.7: Primary | ICD-10-CM

## 2020-10-13 PROCEDURE — 99284 EMERGENCY DEPT VISIT MOD MDM: CPT

## 2020-10-13 PROCEDURE — 64400 NJX AA&/STRD TRIGEMINAL NRV: CPT

## 2020-10-13 NOTE — NUR
NO DENTAL OR HEALTH INSURANCE STATES HAS BEEN UNABLE TO SEE DENTIST. NOW WITH 
PAIN, TO TEETH THINKS HAS INFECTION.

## 2020-10-14 ENCOUNTER — HOSPITAL ENCOUNTER (EMERGENCY)
Facility: MEDICAL CENTER | Age: 25
End: 2020-10-14
Attending: EMERGENCY MEDICINE
Payer: MEDICAID

## 2020-10-14 VITALS
BODY MASS INDEX: 23.49 KG/M2 | DIASTOLIC BLOOD PRESSURE: 93 MMHG | TEMPERATURE: 97.3 F | OXYGEN SATURATION: 98 % | RESPIRATION RATE: 18 BRPM | WEIGHT: 193 LBS | HEART RATE: 68 BPM | SYSTOLIC BLOOD PRESSURE: 111 MMHG

## 2020-10-14 DIAGNOSIS — K08.89 PAIN, DENTAL: ICD-10-CM

## 2020-10-14 PROCEDURE — 700102 HCHG RX REV CODE 250 W/ 637 OVERRIDE(OP): Performed by: EMERGENCY MEDICINE

## 2020-10-14 PROCEDURE — A9270 NON-COVERED ITEM OR SERVICE: HCPCS | Performed by: EMERGENCY MEDICINE

## 2020-10-14 PROCEDURE — 99283 EMERGENCY DEPT VISIT LOW MDM: CPT

## 2020-10-14 RX ORDER — IBUPROFEN 600 MG/1
600 TABLET ORAL ONCE
Status: COMPLETED | OUTPATIENT
Start: 2020-10-14 | End: 2020-10-14

## 2020-10-14 RX ORDER — AMOXICILLIN 500 MG/1
1 TABLET, FILM COATED ORAL 3 TIMES DAILY
Qty: 30 TAB | Refills: 0 | Status: SHIPPED | OUTPATIENT
Start: 2020-10-14 | End: 2020-10-24

## 2020-10-14 RX ADMIN — IBUPROFEN 600 MG: 600 TABLET, FILM COATED ORAL at 14:20

## 2020-10-14 ASSESSMENT — FIBROSIS 4 INDEX: FIB4 SCORE: 0.47

## 2020-10-14 ASSESSMENT — LIFESTYLE VARIABLES: DO YOU DRINK ALCOHOL: NO

## 2020-10-14 NOTE — ED PROVIDER NOTES
ED Provider Note    Scribed for John Dobson M.D. by Vito Castillo. 10/14/2020, 1:38 PM.    Primary care provider: None noted  Means of arrival: Walk-in  History obtained from: Patient  History limited by: None    CHIEF COMPLAINT  Chief Complaint   Patient presents with   • Dental Pain     Upper and lower dental pain x 3 days.  Unable to eat, sleep due to the pain.  No dentist will take him without cash up front.         HPI  Alex Nation is a 25 y.o. male who presents to the Emergency Department for left sided dental pain onset 2 days ago. Patient states that he is unable to eat or sleep due to his pain. Patient states that he has been experiencing a migraine as well. Patient notes that no dentist will take him due to his financial situation. Patient denies any fevers, chills, or sweating. Patient states that both hot and cold foods exacerbate his symptoms. Patient denies any alleviating factors. Patient denies any allergies. Patient notes that he was seen at [facility] for the same symptoms recently.   PPE Note: I personally donned full PPE for all patient encounters during this visit, including being clean-shaven with a mask and gloves.    Scribe remained outside the patient's room and did not have any contact with the patient for the duration of patient encounter.     REVIEW OF SYSTEMS  As above, see HPI for further details.     PAST MEDICAL HISTORY   has a past medical history of ADD (attention deficit disorder with hyperactivity), ADHD (attention deficit hyperactivity disorder), Bipolar 2 disorder (HCC), Bipolar affective (HCC), Depression, Hypothyroid, Hypothyroidism, Schizophrenia (HCC), and Suicidal attempted.    SURGICAL HISTORY  patient denies any surgical history    SOCIAL HISTORY  Social History     Tobacco Use   • Smoking status: Former Smoker     Packs/day: 0.25     Years: 6.00     Pack years: 1.50     Types: Cigarettes   • Smokeless tobacco: Never Used   Substance Use Topics   • Alcohol use:  Not Currently     Frequency: Monthly or less     Drinks per session: 1 or 2     Binge frequency: Never     Comment: occ   • Drug use: Not Currently      Social History     Substance and Sexual Activity   Drug Use Not Currently       FAMILY HISTORY  History reviewed. No pertinent family history.    CURRENT MEDICATIONS  Home Medications     Reviewed by Bettye Zuniga R.N. (Registered Nurse) on 10/14/20 at 1237  Med List Status: <None>   Medication Last Dose Status        Patient Vince Taking any Medications                       ALLERGIES  No Known Allergies    PHYSICAL EXAM  VITAL SIGNS: /74   Pulse 66   Temp 36.3 °C (97.3 °F) (Temporal)   Resp 16   Wt 87.5 kg (193 lb)   SpO2 96%   BMI 23.49 kg/m²   Constitutional: Well developed, Well nourished, No acute distress, Non-toxic appearance.   HENT: Multiple dental caries, teeth otherwise normal, tenderness at first upper and lower molar, gingiva slightly erythematous with no signs of abscess. Normocephalic, Atraumatic, Bilateral external ears normal, oropharynx moist, No oral exudates, Nose normal.   Eyes:conjunctiva is normal, there are no signs of exudate.   Neck: Supple, no meningeal signs.  Lymphatic: No lymphadenopathy noted.   Cardiovascular: Regular rate and rhythm without murmurs gallops or rubs.   Thorax & Lungs: No respiratory distress. Breathing comfortably. Lungs are clear to auscultation bilaterally, there are no wheezes no rales. Chest wall is nontender.    COURSE & MEDICAL DECISION MAKING  Pertinent Labs & Imaging studies reviewed. (See chart for details)    1:38 PM - Patient seen and examined at bedside. Patient is to be seen by social work. Patient was informed that he is to be discharged with Amoxicillin. Patient verbalized his understanding and agreement with the plan of discharge.    Decision Making:  Patient presents to the emergency department for evaluation of her dental pain.  There is a mild amount of erythema and swelling to the  area no overt signs of abscess at this time.  No signs of Rio's angina.  At this point I will start the patient on antibiotics.  The patient is a follow-up with a dentist for further outpatient treatment care return if any symptoms worsen.  Patient was unable to get antibiotics from [place] so I did have our  talk to the patient about options for antibiotics and locations to obtain care and help.    The patient will return for new or worsening symptoms and is stable at the time of discharge.    The patient is referred to a primary physician for blood pressure management, diabetic screening, and for all other preventative health concerns.    DISPOSITION:  Patient will be discharged home in stable condition.    FOLLOW UP:  A Dentist            OUTPATIENT MEDICATIONS:  Discharge Medication List as of 10/14/2020  2:04 PM      START taking these medications    Details   Amoxicillin 500 MG Tab Take 1 Tab by mouth 3 times a day for 10 days., Disp-30 Tab,R-0,LEE, Print Rx Paper              FINAL IMPRESSION  1. Pain, dental          I, Vito Castillo (Justa), am scribing for, and in the presence of, John Dobson M.D..    Electronically signed by: Vito Castillo (Justa), 10/14/2020    I, John Dobson M.D. personally performed the services described in this documentation, as scribed by Vito Castillo in my presence, and it is both accurate and complete. E    The note accurately reflects work and decisions made by me.  John Dobson M.D.  10/14/2020  4:57 PM

## 2020-10-14 NOTE — ED TRIAGE NOTES
Chief Complaint   Patient presents with   • Dental Pain     Upper and lower dental pain x 3 days.  Unable to eat, sleep due to the pain.  No dentist will take him without cash up front.

## 2020-10-14 NOTE — ED NOTES
Pt discharge home. Pt given discharge instructions and prescription. Pt verbalized understanding, all questions answered ,vss upon d/c. Pt steady on feet upon discharge  Pt was given med rx info for cheaper medication rate, also given dental referral list.

## 2020-10-16 ENCOUNTER — HOSPITAL ENCOUNTER (EMERGENCY)
Dept: HOSPITAL 8 - ED | Age: 25
Discharge: HOME | End: 2020-10-16
Payer: COMMERCIAL

## 2020-10-16 VITALS — BODY MASS INDEX: 22.65 KG/M2 | WEIGHT: 191.8 LBS | HEIGHT: 77 IN

## 2020-10-16 VITALS — SYSTOLIC BLOOD PRESSURE: 130 MMHG | DIASTOLIC BLOOD PRESSURE: 80 MMHG

## 2020-10-16 DIAGNOSIS — K08.89: Primary | ICD-10-CM

## 2020-10-16 DIAGNOSIS — F17.200: ICD-10-CM

## 2020-10-16 PROCEDURE — 99284 EMERGENCY DEPT VISIT MOD MDM: CPT

## 2020-10-16 PROCEDURE — 64400 NJX AA&/STRD TRIGEMINAL NRV: CPT

## 2020-10-16 NOTE — NUR
PT MEDICATED PER EMAR. DC EDUCATION PROVIDED. PT DEMONSTRATES UNDERSTANDING. PT 
AMBULATED STEADILY TO DC WITH RN. PT TO AMBULATE HOME.

## 2020-11-27 ENCOUNTER — HOSPITAL ENCOUNTER (EMERGENCY)
Dept: HOSPITAL 8 - ED | Age: 25
Discharge: HOME | End: 2020-11-27
Payer: COMMERCIAL

## 2020-11-27 VITALS — HEIGHT: 76 IN | BODY MASS INDEX: 23.73 KG/M2 | WEIGHT: 194.89 LBS

## 2020-11-27 VITALS — DIASTOLIC BLOOD PRESSURE: 69 MMHG | SYSTOLIC BLOOD PRESSURE: 124 MMHG

## 2020-11-27 DIAGNOSIS — S61.256D: Primary | ICD-10-CM
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
## 2020-11-27 DIAGNOSIS — S61.254D: ICD-10-CM

## 2020-11-27 PROCEDURE — 99283 EMERGENCY DEPT VISIT LOW MDM: CPT

## 2020-11-27 NOTE — NUR
PT D/C WITH D/C SUMMARY AND SCRIPTS. PT EDUCATED ON PROPER WOUND CARE AND ABX 
USE AND VERBALIZES UNDERSTANDING. PT PROVIDED A WORK NOTE PER PT REQUEST. PT 
AMBULATES TO REGISTRATION DESK WITH STEADY GAIT FOR D/C HOME AND DENIES ANY 
OTHER NEEDS PERTAINING TO THIS VISIT.

## 2021-01-29 ENCOUNTER — HOSPITAL ENCOUNTER (EMERGENCY)
Dept: HOSPITAL 8 - ED | Age: 26
Discharge: HOME | End: 2021-01-29
Payer: MEDICAID

## 2021-01-29 VITALS — WEIGHT: 210.76 LBS | BODY MASS INDEX: 24.89 KG/M2 | HEIGHT: 77 IN

## 2021-01-29 VITALS — DIASTOLIC BLOOD PRESSURE: 70 MMHG | SYSTOLIC BLOOD PRESSURE: 106 MMHG

## 2021-01-29 DIAGNOSIS — R68.84: ICD-10-CM

## 2021-01-29 DIAGNOSIS — R42: ICD-10-CM

## 2021-01-29 DIAGNOSIS — K08.89: Primary | ICD-10-CM

## 2021-01-29 DIAGNOSIS — F17.200: ICD-10-CM

## 2021-01-29 PROCEDURE — 93005 ELECTROCARDIOGRAM TRACING: CPT

## 2021-01-29 PROCEDURE — 99283 EMERGENCY DEPT VISIT LOW MDM: CPT

## 2021-01-29 NOTE — NUR
PT AMBULATORY TO ROOM 25 W/ C/O L LOWER DENTAL PAIN STARTED YESTERDAY. PT 
STATES HE HAS MULTIPLE ISSUES W/ TEETH AND PAIN STARTED LAST NOC. PT STATES 
"THE PAIN GETS SO BAD IT MAKES ME DIZZY". PT RESTING ON [place]. VSS. MONITORS 
APPLIED.

## 2021-02-25 ENCOUNTER — HOSPITAL ENCOUNTER (EMERGENCY)
Dept: HOSPITAL 8 - ED | Age: 26
Discharge: HOME | End: 2021-02-25
Payer: COMMERCIAL

## 2021-02-25 VITALS — BODY MASS INDEX: 22.1 KG/M2 | HEIGHT: 77 IN | WEIGHT: 187.17 LBS

## 2021-02-25 VITALS — SYSTOLIC BLOOD PRESSURE: 121 MMHG | DIASTOLIC BLOOD PRESSURE: 74 MMHG

## 2021-02-25 DIAGNOSIS — K21.9: ICD-10-CM

## 2021-02-25 DIAGNOSIS — R21: ICD-10-CM

## 2021-02-25 DIAGNOSIS — L73.9: ICD-10-CM

## 2021-02-25 DIAGNOSIS — R11.2: ICD-10-CM

## 2021-02-25 DIAGNOSIS — A08.4: Primary | ICD-10-CM

## 2021-02-25 LAB
ALBUMIN SERPL-MCNC: 3.8 G/DL (ref 3.4–5)
ALP SERPL-CCNC: 48 U/L (ref 45–117)
ALT SERPL-CCNC: 18 U/L (ref 12–78)
ANION GAP SERPL CALC-SCNC: 7 MMOL/L (ref 5–15)
BASOPHILS # BLD AUTO: 0 X10^3/UL (ref 0–0.1)
BASOPHILS NFR BLD AUTO: 0 % (ref 0–1)
BILIRUB SERPL-MCNC: 1 MG/DL (ref 0.2–1)
CALCIUM SERPL-MCNC: 8.7 MG/DL (ref 8.5–10.1)
CHLORIDE SERPL-SCNC: 111 MMOL/L (ref 98–107)
CREAT SERPL-MCNC: 0.91 MG/DL (ref 0.7–1.3)
EOSINOPHIL # BLD AUTO: 0.1 X10^3/UL (ref 0–0.4)
EOSINOPHIL NFR BLD AUTO: 0 % (ref 1–7)
ERYTHROCYTE [DISTWIDTH] IN BLOOD BY AUTOMATED COUNT: 13.2 % (ref 9.4–14.8)
LYMPHOCYTES # BLD AUTO: 0.3 X10^3/UL (ref 1–3.4)
LYMPHOCYTES NFR BLD AUTO: 3 % (ref 22–44)
MCH RBC QN AUTO: 30.2 PG (ref 27.5–34.5)
MCHC RBC AUTO-ENTMCNC: 33.4 G/DL (ref 33.2–36.2)
MD: (no result)
MONOCYTES # BLD AUTO: 0.4 X10^3/UL (ref 0.2–0.8)
MONOCYTES NFR BLD AUTO: 3 % (ref 2–9)
NEUTROPHILS # BLD AUTO: 12.1 X10^3/UL (ref 1.8–6.8)
NEUTROPHILS NFR BLD AUTO: 94 % (ref 42–75)
PLATELET # BLD AUTO: 201 X10^3/UL (ref 130–400)
PMV BLD AUTO: 9.2 FL (ref 7.4–10.4)
PROT SERPL-MCNC: 7.2 G/DL (ref 6.4–8.2)
RBC # BLD AUTO: 5.21 X10^6/UL (ref 4.38–5.82)

## 2021-02-25 PROCEDURE — 36415 COLL VENOUS BLD VENIPUNCTURE: CPT

## 2021-02-25 PROCEDURE — 85025 COMPLETE CBC W/AUTO DIFF WBC: CPT

## 2021-02-25 PROCEDURE — 80053 COMPREHEN METABOLIC PANEL: CPT

## 2021-02-25 PROCEDURE — 99284 EMERGENCY DEPT VISIT MOD MDM: CPT

## 2021-02-25 NOTE — NUR
PT AMBULATORY TO ROOM 29 W/ C/O BILAT LOWER ABD PAIN STARTED TODAY AT 0430. PT 
STATES HE ATE TUNA LAST NIGHT AND BEGAN HAVING ABD PAIN AFTER HAVING ONE BOUT 
OF EMESIS. PT ALSO C/O DIARRHEA MULTIPLE TIMES. PT RESTING ON GURNEY. NADN. 
MONITORS APPLIED. WARM BLANKET PROVIDED. VSS.

## 2021-02-25 NOTE — NUR
PT RESTING ON [employer] PLAYING ON PHONE. NADN. ALL RESULTS ARE BACK AT THIS TIME. 
CHART UP FOR RECHECK.

## 2021-02-27 ENCOUNTER — APPOINTMENT (OUTPATIENT)
Dept: RADIOLOGY | Facility: MEDICAL CENTER | Age: 26
End: 2021-02-27
Attending: EMERGENCY MEDICINE
Payer: COMMERCIAL

## 2021-02-27 ENCOUNTER — HOSPITAL ENCOUNTER (EMERGENCY)
Facility: MEDICAL CENTER | Age: 26
End: 2021-02-27
Attending: EMERGENCY MEDICINE
Payer: COMMERCIAL

## 2021-02-27 VITALS
WEIGHT: 187.83 LBS | BODY MASS INDEX: 22.18 KG/M2 | DIASTOLIC BLOOD PRESSURE: 56 MMHG | HEIGHT: 77 IN | HEART RATE: 64 BPM | SYSTOLIC BLOOD PRESSURE: 108 MMHG | TEMPERATURE: 98.1 F | OXYGEN SATURATION: 93 % | RESPIRATION RATE: 19 BRPM

## 2021-02-27 DIAGNOSIS — R10.9 ABDOMINAL PAIN, UNSPECIFIED ABDOMINAL LOCATION: ICD-10-CM

## 2021-02-27 LAB
ALBUMIN SERPL BCP-MCNC: 4.1 G/DL (ref 3.2–4.9)
ALBUMIN/GLOB SERPL: 1.3 G/DL
ALP SERPL-CCNC: 45 U/L (ref 30–99)
ALT SERPL-CCNC: 10 U/L (ref 2–50)
ANION GAP SERPL CALC-SCNC: 13 MMOL/L (ref 7–16)
AST SERPL-CCNC: 20 U/L (ref 12–45)
BASOPHILS # BLD AUTO: 0.3 % (ref 0–1.8)
BASOPHILS # BLD: 0.02 K/UL (ref 0–0.12)
BILIRUB SERPL-MCNC: 0.4 MG/DL (ref 0.1–1.5)
BUN SERPL-MCNC: 9 MG/DL (ref 8–22)
CALCIUM SERPL-MCNC: 8.9 MG/DL (ref 8.5–10.5)
CHLORIDE SERPL-SCNC: 102 MMOL/L (ref 96–112)
CO2 SERPL-SCNC: 22 MMOL/L (ref 20–33)
CREAT SERPL-MCNC: 0.94 MG/DL (ref 0.5–1.4)
EOSINOPHIL # BLD AUTO: 0.05 K/UL (ref 0–0.51)
EOSINOPHIL NFR BLD: 0.7 % (ref 0–6.9)
ERYTHROCYTE [DISTWIDTH] IN BLOOD BY AUTOMATED COUNT: 39.8 FL (ref 35.9–50)
GLOBULIN SER CALC-MCNC: 3.2 G/DL (ref 1.9–3.5)
GLUCOSE SERPL-MCNC: 114 MG/DL (ref 65–99)
HCT VFR BLD AUTO: 47.2 % (ref 42–52)
HGB BLD-MCNC: 15.5 G/DL (ref 14–18)
IMM GRANULOCYTES # BLD AUTO: 0.03 K/UL (ref 0–0.11)
IMM GRANULOCYTES NFR BLD AUTO: 0.4 % (ref 0–0.9)
LACTATE BLD-SCNC: 1.2 MMOL/L (ref 0.5–2)
LIPASE SERPL-CCNC: 27 U/L (ref 11–82)
LYMPHOCYTES # BLD AUTO: 0.73 K/UL (ref 1–4.8)
LYMPHOCYTES NFR BLD: 9.7 % (ref 22–41)
MCH RBC QN AUTO: 29.7 PG (ref 27–33)
MCHC RBC AUTO-ENTMCNC: 32.8 G/DL (ref 33.7–35.3)
MCV RBC AUTO: 90.4 FL (ref 81.4–97.8)
MONOCYTES # BLD AUTO: 0.78 K/UL (ref 0–0.85)
MONOCYTES NFR BLD AUTO: 10.4 % (ref 0–13.4)
NEUTROPHILS # BLD AUTO: 5.88 K/UL (ref 1.82–7.42)
NEUTROPHILS NFR BLD: 78.5 % (ref 44–72)
NRBC # BLD AUTO: 0 K/UL
NRBC BLD-RTO: 0 /100 WBC
PLATELET # BLD AUTO: 209 K/UL (ref 164–446)
PMV BLD AUTO: 10.9 FL (ref 9–12.9)
POTASSIUM SERPL-SCNC: 3.6 MMOL/L (ref 3.6–5.5)
PROT SERPL-MCNC: 7.3 G/DL (ref 6–8.2)
RBC # BLD AUTO: 5.22 M/UL (ref 4.7–6.1)
SODIUM SERPL-SCNC: 137 MMOL/L (ref 135–145)
WBC # BLD AUTO: 7.5 K/UL (ref 4.8–10.8)

## 2021-02-27 PROCEDURE — 700105 HCHG RX REV CODE 258: Performed by: EMERGENCY MEDICINE

## 2021-02-27 PROCEDURE — 96374 THER/PROPH/DIAG INJ IV PUSH: CPT | Mod: XU

## 2021-02-27 PROCEDURE — 83690 ASSAY OF LIPASE: CPT

## 2021-02-27 PROCEDURE — 85025 COMPLETE CBC W/AUTO DIFF WBC: CPT

## 2021-02-27 PROCEDURE — 99284 EMERGENCY DEPT VISIT MOD MDM: CPT

## 2021-02-27 PROCEDURE — 700111 HCHG RX REV CODE 636 W/ 250 OVERRIDE (IP): Performed by: EMERGENCY MEDICINE

## 2021-02-27 PROCEDURE — 80053 COMPREHEN METABOLIC PANEL: CPT

## 2021-02-27 PROCEDURE — 83605 ASSAY OF LACTIC ACID: CPT

## 2021-02-27 PROCEDURE — 700117 HCHG RX CONTRAST REV CODE 255: Performed by: EMERGENCY MEDICINE

## 2021-02-27 PROCEDURE — 74177 CT ABD & PELVIS W/CONTRAST: CPT

## 2021-02-27 RX ORDER — ONDANSETRON 2 MG/ML
4 INJECTION INTRAMUSCULAR; INTRAVENOUS ONCE
Status: COMPLETED | OUTPATIENT
Start: 2021-02-27 | End: 2021-02-27

## 2021-02-27 RX ORDER — SODIUM CHLORIDE 9 MG/ML
1000 INJECTION, SOLUTION INTRAVENOUS ONCE
Status: COMPLETED | OUTPATIENT
Start: 2021-02-27 | End: 2021-02-27

## 2021-02-27 RX ORDER — ONDANSETRON 4 MG/1
4 TABLET, FILM COATED ORAL EVERY 8 HOURS PRN
Qty: 6 TABLET | Refills: 1 | Status: SHIPPED | OUTPATIENT
Start: 2021-02-27

## 2021-02-27 RX ADMIN — IOHEXOL 80 ML: 350 INJECTION, SOLUTION INTRAVENOUS at 22:04

## 2021-02-27 RX ADMIN — SODIUM CHLORIDE 1000 ML: 9 INJECTION, SOLUTION INTRAVENOUS at 20:56

## 2021-02-27 RX ADMIN — ONDANSETRON 4 MG: 2 INJECTION INTRAMUSCULAR; INTRAVENOUS at 20:56

## 2021-02-27 ASSESSMENT — FIBROSIS 4 INDEX: FIB4 SCORE: 0.47

## 2021-02-28 NOTE — ED NOTES
Pt discharged home. IV removed. AVS reviewed and understood, paper prescription with pt. All questions answered

## 2021-02-28 NOTE — ED TRIAGE NOTES
Alex Nation  25 y.o.  Chief Complaint   Patient presents with   • Nausea/Vomiting/Diarrhea     pt presents w N/V/D since this am; reports he also felt this way a few days ago; pt reports intermittent fevers up to 101F   • Abdominal Pain     generalized; denies urinary sx

## 2021-02-28 NOTE — ED PROVIDER NOTES
"ED Provider Note    CHIEF COMPLAINT  Chief Complaint   Patient presents with   • Nausea/Vomiting/Diarrhea     pt presents w N/V/D since this am; reports he also felt this way a few days ago; pt reports intermittent fevers up to 101F   • Abdominal Pain     generalized; denies urinary sx       HPI  Alex Nation is a 25 y.o. male who presents to the emergency department he states on Thursday morning he started to having about 2 hours of diarrhea and lower abdominal pain.  He just did not feel well he has a 1-year-old daughter who was on the floor with him and press down on his stomach and it hurt.  He however woke up the next morning and felt normal.  Today he states he had recurrence he woke up with chills a fever to 101 vomiting like it was pouring out of him and nonbloody diarrhea.  No one else in the family is sick he has had no cough shortness of breath dysuria loss of taste or smell no blood in his vomit or blood in his stool    REVIEW OF SYSTEMS  As above all other systems are negative  PAST MEDICAL HISTORY   has a past medical history of ADD (attention deficit disorder with hyperactivity), ADHD (attention deficit hyperactivity disorder), Bipolar 2 disorder (HCC), Bipolar affective (HCC), Depression, Hypothyroid, Hypothyroidism, Schizophrenia (HCC), and Suicidal attempted.    SOCIAL HISTORY  Social History     Tobacco Use   • Smoking status: Former Smoker     Packs/day: 0.25     Years: 6.00     Pack years: 1.50     Types: Cigarettes   • Smokeless tobacco: Never Used   Substance and Sexual Activity   • Alcohol use: Not Currently     Comment: occ   • Drug use: Not Currently   • Sexual activity: Not on file       SURGICAL HISTORY  patient denies any surgical history    CURRENT MEDICATIONS  None    ALLERGIES  No Known Allergies    PHYSICAL EXAM  VITAL SIGNS: /81   Pulse 87   Temp 36.4 °C (97.5 °F) (Temporal)   Resp 15   Ht 1.956 m (6' 5\")   Wt 85.2 kg (187 lb 13.3 oz)   SpO2 94%   BMI 22.27 kg/m² "   Constitutional:  Alert in no apparent distress.  HENT: Normocephalic, Bilateral external ears normal. Nose normal.   Eyes: Pupils are equal and reactive. Conjunctiva normal, non-icteric.   Thorax & Lungs: Easy unlabored respirations  Abdomen:  No gross signs of peritonitis, no pain with movement   Skin: Visualized skin is  Dry, No erythema, No rash.   Extremities:   No edema, No asymmetry  Neurologic: Alert, Grossly non-focal.   Psychiatric: Affect and Mood normal      COURSE & MEDICAL DECISION MAKING  Nursing notes and vital signs were reviewed. (See chart for details)    The patient presents to the Emergency Department with recurrent abdominal pain associated with fever, the patient has been intermittently vomiting.  Temperature at home was 101 and is tender on exam without michel evidence of peritonitis.  With recurrence of fevers I am concerned that this may be an intra-abdominal infection and work-up was ensued.  As he said vomiting and diarrhea IV fluids for administered for hydration IV Zofran to stop vomiting    CT-ABDOMEN-PELVIS WITH   Final Result      No acute abnormality in the abdomen or pelvis.        Results for orders placed or performed during the hospital encounter of 02/27/21   CBC WITH DIFFERENTIAL   Result Value Ref Range    WBC 7.5 4.8 - 10.8 K/uL    RBC 5.22 4.70 - 6.10 M/uL    Hemoglobin 15.5 14.0 - 18.0 g/dL    Hematocrit 47.2 42.0 - 52.0 %    MCV 90.4 81.4 - 97.8 fL    MCH 29.7 27.0 - 33.0 pg    MCHC 32.8 (L) 33.7 - 35.3 g/dL    RDW 39.8 35.9 - 50.0 fL    Platelet Count 209 164 - 446 K/uL    MPV 10.9 9.0 - 12.9 fL    Neutrophils-Polys 78.50 (H) 44.00 - 72.00 %    Lymphocytes 9.70 (L) 22.00 - 41.00 %    Monocytes 10.40 0.00 - 13.40 %    Eosinophils 0.70 0.00 - 6.90 %    Basophils 0.30 0.00 - 1.80 %    Immature Granulocytes 0.40 0.00 - 0.90 %    Nucleated RBC 0.00 /100 WBC    Neutrophils (Absolute) 5.88 1.82 - 7.42 K/uL    Lymphs (Absolute) 0.73 (L) 1.00 - 4.80 K/uL    Monos (Absolute) 0.78  0.00 - 0.85 K/uL    Eos (Absolute) 0.05 0.00 - 0.51 K/uL    Baso (Absolute) 0.02 0.00 - 0.12 K/uL    Immature Granulocytes (abs) 0.03 0.00 - 0.11 K/uL    NRBC (Absolute) 0.00 K/uL   COMP METABOLIC PANEL   Result Value Ref Range    Sodium 137 135 - 145 mmol/L    Potassium 3.6 3.6 - 5.5 mmol/L    Chloride 102 96 - 112 mmol/L    Co2 22 20 - 33 mmol/L    Anion Gap 13.0 7.0 - 16.0    Glucose 114 (H) 65 - 99 mg/dL    Bun 9 8 - 22 mg/dL    Creatinine 0.94 0.50 - 1.40 mg/dL    Calcium 8.9 8.5 - 10.5 mg/dL    AST(SGOT) 20 12 - 45 U/L    ALT(SGPT) 10 2 - 50 U/L    Alkaline Phosphatase 45 30 - 99 U/L    Total Bilirubin 0.4 0.1 - 1.5 mg/dL    Albumin 4.1 3.2 - 4.9 g/dL    Total Protein 7.3 6.0 - 8.2 g/dL    Globulin 3.2 1.9 - 3.5 g/dL    A-G Ratio 1.3 g/dL   LIPASE   Result Value Ref Range    Lipase 27 11 - 82 U/L   LACTIC ACID   Result Value Ref Range    Lactic Acid 1.2 0.5 - 2.0 mmol/L   ESTIMATED GFR   Result Value Ref Range    GFR If African American >60 >60 mL/min/1.73 m 2    GFR If Non African American >60 >60 mL/min/1.73 m 2     Patient had a repeat exam which showed no evidence of peritonitis he is nontoxic his laboratory and imaging is totally unremarkable I feel he can be safely discharged for outpatient management by his primary care I will send him home with Zofran in case he continues to vomit.      The patient is referred to a primary physician for blood pressure management, diabetic screening, and for all other preventative health concerns.        DISPOSITION:  Patient will be discharged home in stable condition.    FOLLOW UP:  [address] [address] [phone]  Call   call Monday at 8 am for new patient appointment      OUTPATIENT MEDICATIONS:  New Prescriptions    ONDANSETRON (ZOFRAN) 4 MG TAB TABLET    Take 1 tablet by mouth every 8 hours as needed for Nausea/Vomiting.         The patient was discharged home with an information sheet on abdominal pain and told to  return immediately for any signs or symptoms listed, but specifically if fever, or any worsening at all.  The patient verbally agreed to the discharge precautions and follow-up plan which is documented in EPIC.    FINAL IMPRESSION  1.abdominal pain  2. vomiting    Electronically signed by: Janine Parker M.D., 2/27/2021 8:53 PM

## 2021-05-06 ENCOUNTER — HOSPITAL ENCOUNTER (EMERGENCY)
Facility: MEDICAL CENTER | Age: 26
End: 2021-05-06
Attending: EMERGENCY MEDICINE
Payer: COMMERCIAL

## 2021-05-06 VITALS
TEMPERATURE: 98.4 F | SYSTOLIC BLOOD PRESSURE: 112 MMHG | DIASTOLIC BLOOD PRESSURE: 68 MMHG | RESPIRATION RATE: 18 BRPM | BODY MASS INDEX: 23.87 KG/M2 | HEIGHT: 77 IN | WEIGHT: 202.16 LBS | OXYGEN SATURATION: 96 % | HEART RATE: 72 BPM

## 2021-05-06 DIAGNOSIS — S61.411A LACERATION OF RIGHT HAND WITHOUT FOREIGN BODY, INITIAL ENCOUNTER: ICD-10-CM

## 2021-05-06 PROCEDURE — 304999 HCHG REPAIR-SIMPLE/INTERMED LEVEL 1

## 2021-05-06 PROCEDURE — 700101 HCHG RX REV CODE 250: Performed by: EMERGENCY MEDICINE

## 2021-05-06 PROCEDURE — 99282 EMERGENCY DEPT VISIT SF MDM: CPT

## 2021-05-06 PROCEDURE — 303747 HCHG EXTRA SUTURE

## 2021-05-06 PROCEDURE — 304217 HCHG IRRIGATION SYSTEM

## 2021-05-06 RX ORDER — LIDOCAINE HYDROCHLORIDE 10 MG/ML
20 INJECTION, SOLUTION INFILTRATION; PERINEURAL ONCE
Status: COMPLETED | OUTPATIENT
Start: 2021-05-06 | End: 2021-05-06

## 2021-05-06 RX ADMIN — LIDOCAINE HYDROCHLORIDE 20 ML: 10 INJECTION, SOLUTION INFILTRATION; PERINEURAL at 17:15

## 2021-05-06 ASSESSMENT — FIBROSIS 4 INDEX: FIB4 SCORE: 0.79

## 2021-05-06 NOTE — ED TRIAGE NOTES
Chief Complaint   Patient presents with   • Hand Laceration     laceration to posterior surface of R hand     Bleeding controlled

## 2021-05-06 NOTE — ED NOTES
Pt states he was using skillsaw when had kickback and blade caught top of right hand. No active bleeding at this time. Denies other injury or illness. Speaking without signs of distress.

## 2021-05-06 NOTE — ED PROVIDER NOTES
ED Provider Note    Scribed for Maria Fernanda Correia M.D. by Billy Saul. 5/6/2021  4:50 PM    Primary care provider: Pcp Pt States None  Means of arrival: Walk-in  History obtained from: Patient  History limited by: None    CHIEF COMPLAINT  Chief Complaint   Patient presents with    Hand Laceration     laceration to posterior surface of R hand       HPI  Alex Nation is a 26 y.o. male who presents to the Emergency Department with a right hand laceration sustained earlier today. He was using a skillsaw which kicked back and nicked him on the back of his right hand. He medicated with ibuprofen to treat his pain. He does not mention any other medical complaints at this time. His tetanus is UTD.    REVIEW OF SYSTEMS  Neuro: no weakness, numbness neurovacularly intact distal to injury  Musculoskeletal: Right hand laceration, no swelling, deformity, or joint swelling  Endocrine: no fevers, sweating,   SKIN: no rash, erythema, or contusions     See history of present illness.     PAST MEDICAL HISTORY   has a past medical history of ADD (attention deficit disorder with hyperactivity), ADHD (attention deficit hyperactivity disorder), Bipolar 2 disorder (HCC), Bipolar affective (HCC), Depression, Hypothyroid, Hypothyroidism, Schizophrenia (HCC), and Suicidal attempted.    SURGICAL HISTORY  patient denies any surgical history    SOCIAL HISTORY  Social History     Tobacco Use    Smoking status: Former Smoker     Packs/day: 0.25     Years: 6.00     Pack years: 1.50     Types: Cigarettes    Smokeless tobacco: Never Used   Substance Use Topics    Alcohol use: Not Currently     Comment: occ    Drug use: Not Currently      Social History     Substance and Sexual Activity   Drug Use Not Currently       FAMILY HISTORY  No family history on file.    CURRENT MEDICATIONS  Current Outpatient Medications   Medication Instructions    ondansetron (ZOFRAN) 4 mg, Oral, EVERY 8 HOURS PRN          ALLERGIES  No Known Allergies    PHYSICAL  "EXAM  VITAL SIGNS: /70   Pulse 72   Temp 36.9 °C (98.4 °F) (Temporal)   Resp 18   Ht 1.956 m (6' 5\")   Wt 91.7 kg (202 lb 2.6 oz)   SpO2 96%   BMI 23.97 kg/m²     Constitutional: No distress  Skin: Warm, dry, no erythema, no rash  Musculoskeletal: 4 cm Laceration to the dorsal aspect of the right hand  Vascular: warm to touch good capillary refill   Neurologic: distally neurovascularly intact  Psychiatric: Affect normal    DIAGNOSTIC STUDIES/PROCEDURES    Laceration Repair Procedure Note    Indication: Laceration    Procedure: The patient was placed in the appropriate position and anesthesia around the laceration was obtained by infiltration using 1% Lidocaine without epinephrine. The area was then cleansed with betadine and draped in a sterile fashion. The wound was explored and no  foreign body/bodies was/were found. The laceration was closed with 5-0 Ethilon using interrupted sutures. There were no additional lacerations requiring repair. The wound area was then dressed with bacitracin.      Total repaired wound length: 4 cm.     Other Items: Suture count: 7    The patient tolerated the procedure well.    Complications: None      COURSE & MEDICAL DECISION MAKING  Nursing notes, VS, PMSFHx reviewed in chart.    4:50 PM - Patient seen and examined at bedside by resident. Patient will be treated with lidocaine 1% injection. Laceration repair procedure performed by resident. The patient was instructed to return to his primary care provider in seven days for follow up and suture removal. Follow up instructions and return precautions discussed. Patient verbalizes understanding and agreement to this plan.    The patient will return for new or worsening symptoms and is stable at the time of discharge.    I independently evaluated the patient and repeated the important components of the history and physical.  I discussed the management with the resident.  I have reviewed and agree with the pertinent clinical " information as above including history, exam, study findings and recommendations.    DISPOSITION:  Patient will be discharged home in stable condition.    FOLLOW UP:  [facility], Emergency Dept  [address]  [place] [address] [phone]  In 1 week  For suture removal    FINAL IMPRESSION  1. Laceration of right hand without foreign body, initial encounter          Billy BARKSDALE (Scribsukhdev), am scribing for, and in the presence of, Maria Fernanda Correia M.D..    Electronically signed by: Billy Saul (Scribe), 5/6/2021    Maria Fernanda BARKSDALE M.D. personally performed the services described in this documentation, as scribed by Billy Saul in my presence, and it is both accurate and complete.    E.    The note accurately reflects work and decisions made by me.  Maria Fernanda Correia M.D.  5/6/2021  9:12 PM

## 2021-05-07 ENCOUNTER — HOSPITAL ENCOUNTER (EMERGENCY)
Dept: HOSPITAL 8 - ED | Age: 26
Discharge: HOME | End: 2021-05-07
Payer: MEDICAID

## 2021-05-07 VITALS — SYSTOLIC BLOOD PRESSURE: 134 MMHG | DIASTOLIC BLOOD PRESSURE: 68 MMHG

## 2021-05-07 VITALS — BODY MASS INDEX: 24.05 KG/M2 | HEIGHT: 77 IN | WEIGHT: 203.71 LBS
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
## 2021-05-07 DIAGNOSIS — F17.200: ICD-10-CM

## 2021-05-07 DIAGNOSIS — S61.411D: Primary | ICD-10-CM

## 2021-05-07 DIAGNOSIS — L03.113: ICD-10-CM

## 2021-05-07 PROCEDURE — 99283 EMERGENCY DEPT VISIT LOW MDM: CPT

## 2021-05-07 PROCEDURE — 96372 THER/PROPH/DIAG INJ SC/IM: CPT

## 2021-05-08 ENCOUNTER — HOSPITAL ENCOUNTER (EMERGENCY)
Dept: HOSPITAL 8 - ED | Age: 26
Discharge: HOME | End: 2021-05-08
Payer: MEDICAID

## 2021-05-08 VITALS — HEIGHT: 77 IN | BODY MASS INDEX: 24.36 KG/M2 | WEIGHT: 206.35 LBS

## 2021-05-08 VITALS — DIASTOLIC BLOOD PRESSURE: 60 MMHG | SYSTOLIC BLOOD PRESSURE: 118 MMHG
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
## 2021-05-08 DIAGNOSIS — S61.411D: Primary | ICD-10-CM

## 2021-05-08 DIAGNOSIS — Z48.02: ICD-10-CM

## 2021-05-08 PROCEDURE — 99281 EMR DPT VST MAYX REQ PHY/QHP: CPT

## 2021-07-15 ENCOUNTER — HOSPITAL ENCOUNTER (EMERGENCY)
Facility: MEDICAL CENTER | Age: 26
End: 2021-07-16
Attending: EMERGENCY MEDICINE
Payer: COMMERCIAL

## 2021-07-15 DIAGNOSIS — T50.Z95A BODY ACHES AFTER VACCINATION: ICD-10-CM

## 2021-07-15 DIAGNOSIS — R51.9 ACUTE NONINTRACTABLE HEADACHE, UNSPECIFIED HEADACHE TYPE: ICD-10-CM

## 2021-07-15 DIAGNOSIS — R52 BODY ACHES AFTER VACCINATION: ICD-10-CM

## 2021-07-15 PROCEDURE — 99283 EMERGENCY DEPT VISIT LOW MDM: CPT

## 2021-07-15 PROCEDURE — 96372 THER/PROPH/DIAG INJ SC/IM: CPT

## 2021-07-15 PROCEDURE — 700111 HCHG RX REV CODE 636 W/ 250 OVERRIDE (IP): Performed by: EMERGENCY MEDICINE

## 2021-07-15 RX ORDER — ONDANSETRON 4 MG/1
4 TABLET, ORALLY DISINTEGRATING ORAL EVERY 6 HOURS PRN
Qty: 10 TABLET | Refills: 0 | Status: SHIPPED | OUTPATIENT
Start: 2021-07-15

## 2021-07-15 RX ORDER — KETOROLAC TROMETHAMINE 30 MG/ML
60 INJECTION, SOLUTION INTRAMUSCULAR; INTRAVENOUS ONCE
Status: COMPLETED | OUTPATIENT
Start: 2021-07-15 | End: 2021-07-15

## 2021-07-15 RX ORDER — IBUPROFEN 800 MG/1
800 TABLET ORAL EVERY 8 HOURS PRN
Qty: 30 TABLET | Refills: 0 | Status: SHIPPED | OUTPATIENT
Start: 2021-07-15

## 2021-07-15 RX ORDER — ONDANSETRON 4 MG/1
4 TABLET, ORALLY DISINTEGRATING ORAL ONCE
Status: COMPLETED | OUTPATIENT
Start: 2021-07-15 | End: 2021-07-15

## 2021-07-15 RX ADMIN — ONDANSETRON 4 MG: 4 TABLET, ORALLY DISINTEGRATING ORAL at 23:30

## 2021-07-15 RX ADMIN — KETOROLAC TROMETHAMINE 60 MG: 30 INJECTION, SOLUTION INTRAMUSCULAR; INTRAVENOUS at 23:30

## 2021-07-15 ASSESSMENT — FIBROSIS 4 INDEX: FIB4 SCORE: 0.79

## 2021-07-16 VITALS
HEART RATE: 75 BPM | WEIGHT: 212.74 LBS | OXYGEN SATURATION: 93 % | DIASTOLIC BLOOD PRESSURE: 68 MMHG | RESPIRATION RATE: 18 BRPM | SYSTOLIC BLOOD PRESSURE: 123 MMHG | HEIGHT: 76 IN | TEMPERATURE: 97.8 F | BODY MASS INDEX: 25.91 KG/M2

## 2021-07-16 NOTE — ED TRIAGE NOTES
Chief Complaint   Patient presents with   • Shoulder Pain     where pt received 2nd COVID shot yesterday   • Migraine     since 0300, pt thinks this is from getting his shot yesterday   • Nausea     since 1200 today; pt denies vomiting     Pt ambulatory to triage for above complaint. Pt feels like all his symptoms are from getting his 2nd COVID shot yesterday.     Pt is alert/oriented and follows commands. Pt speaking in full sentences and responds appropriately to questions. No acute distress noted in triage and respirations are even and unlabored.     Pt placed in lobby and educated on triage process. Pt encouraged to alert staff for any changes in condition.

## 2021-07-16 NOTE — ED PROVIDER NOTES
CHIEF COMPLAINT  Chief Complaint   Patient presents with   • Shoulder Pain     where pt received 2nd COVID shot yesterday   • Migraine     since 0300, pt thinks this is from getting his shot yesterday   • Nausea     since 1200 today; pt denies vomiting       HPI  Alex Nation is a 26 y.o. male who presents tonight with a chief complaint of shoulder pain where he received his Covid vaccine yesterday, headache on the right side of his head which she describes as fireworks going off and has had and nausea.  He denies any vomiting, fever, chills, neck pain.    REVIEW OF SYSTEMS  See HPI for further details. All other system reviews are negative.    PAST MEDICAL HISTORY  Past Medical History:   Diagnosis Date   • ADHD (attention deficit hyperactivity disorder)    • Bipolar 2 disorder (HCC)    • Bipolar affective (HCC)    • Depression    • Hypothyroidism    • Schizophrenia (HCC)    • Suicidal attempted     3rd attempt in 1 month       FAMILY HISTORY  History reviewed. No pertinent family history.    SOCIAL HISTORY  Social History     Socioeconomic History   • Marital status: Single     Spouse name: Not on file   • Number of children: Not on file   • Years of education: Not on file   • Highest education level: Not on file   Occupational History   • Not on file   Tobacco Use   • Smoking status: Former Smoker     Packs/day: 0.25     Years: 6.00     Pack years: 1.50     Types: Cigarettes   • Smokeless tobacco: Never Used   Vaping Use   • Vaping Use: Some days   • Substances: CBD   • Devices: Pre-filled pod   Substance and Sexual Activity   • Alcohol use: Not Currently   • Drug use: Not Currently   • Sexual activity: Not on file   Other Topics Concern   • Not on file   Social History Narrative    ** Merged History Encounter **          Social Determinants of Health     Financial Resource Strain:    • Difficulty of Paying Living Expenses:    Food Insecurity:    • Worried About Running Out of Food in the Last Year:    • Ran  "Out of Food in the Last Year:    Transportation Needs:    • Lack of Transportation (Medical):    • Lack of Transportation (Non-Medical):    Physical Activity:    • Days of Exercise per Week:    • Minutes of Exercise per Session:    Stress:    • Feeling of Stress :    Social Connections:    • Frequency of Communication with Friends and Family:    • Frequency of Social Gatherings with Friends and Family:    • Attends [employer] Services:    • Active Member of Clubs or Organizations:    • Attends Club or Organization Meetings:    • Marital Status:    Intimate Partner Violence:    • Fear of Current or Ex-Partner:    • Emotionally Abused:    • Physically Abused:    • Sexually Abused:        SURGICAL HISTORY  History reviewed. No pertinent surgical history.    CURRENT MEDICATIONS  See nurses notes    ALLERGIES  Allergies   Allergen Reactions   • Benadryl [Altaryl] Hives       PHYSICAL EXAM  VITAL SIGNS: /73   Pulse 75   Temp 36.4 °C (97.5 °F) (Temporal)   Resp 20   Ht 1.93 m (6' 4\")   Wt 96.5 kg (212 lb 11.9 oz)   SpO2 93%   BMI 25.90 kg/m²     Constitutional: Patient is well developed, well nourished in moderate distress when awakened.  Patient took over 3 minutes to awaken when I went into the room to examine him.  He was awake alert and oriented.  HENT: Normocephalic, atraumatic. Oropharynx moist without erythema or exudates, nose normal with no mucosal edema or drainage.   Eyes: PERRL, EOMI  Neck: Supple with cervical adenopathy. Normal range of motion in flexion, extension and lateral rotation. No tenderness along the bony prominences or paraspinal muscles.  No rigidity.  Lymphatic: No lymphadenopathy noted.   Cardiovascular: Normal heart rate and rhythm. No murmur  Thorax & Lungs: Clear and equal breath sounds with good excursion. No respiratory distress, no rhonchi or wheezing.  Abdomen: Bowel sounds normal in all four quadrants. Soft,nontender, no rebound , guarding, palpable masses.   Skin: Warm, Dry, " No rashes.   Back: No cervical, thoracic, or lumbosacral tenderness.   Extremities: Peripheral pulses 4/4 subjective shoulder tenderness.  No signs of trauma, normal range of motion.  Neurologic: Alert & oriented x 3, Normal motor function, Normal sensory function, no lateralizing deficits.  Psychiatric: Affect normal,  Mood normal.       COURSE & MEDICAL DECISION MAKING  Pertinent Labs & Imaging studies reviewed. (See chart for details)  Patient received Toradol and Zofran here in the ER and was markedly improved.  He is to be at bedrest for the next several days, increase fluids, Tylenol and ibuprofen as needed, prescription for Zofran and Motrin were sent to his local pharmacy.  He was discharged in stable and improved condition.    FINAL IMPRESSION  1.  Body aches after vaccination for Covid  2.  Acute headache  3.         Electronically signed by: Sanjuanita Escobar D.O., 7/15/2021 11:41 PMED Provider Note

## 2021-07-16 NOTE — DISCHARGE INSTRUCTIONS
Rest as much as possible  Increase fluids especially water and water based products.  Tylenol as needed for fever greater than 101  Take the nausea medications as needed and the pain medication for your headaches and body aches.  Follow-up with your primary care provider or [facility] within the week for recheck.

## 2021-09-09 ENCOUNTER — HOSPITAL ENCOUNTER (EMERGENCY)
Dept: HOSPITAL 8 - ED | Age: 26
Discharge: HOME | End: 2021-09-09
Payer: MEDICAID

## 2021-09-09 ENCOUNTER — HOSPITAL ENCOUNTER (EMERGENCY)
Dept: HOSPITAL 8 - ED | Age: 26
LOS: 1 days | Discharge: LEFT BEFORE DISCHARGE | End: 2021-09-10
Payer: MEDICAID

## 2021-09-09 VITALS — DIASTOLIC BLOOD PRESSURE: 78 MMHG | SYSTOLIC BLOOD PRESSURE: 118 MMHG

## 2021-09-09 VITALS — HEIGHT: 77 IN | BODY MASS INDEX: 26.08 KG/M2 | WEIGHT: 220.9 LBS

## 2021-09-09 VITALS — DIASTOLIC BLOOD PRESSURE: 58 MMHG | SYSTOLIC BLOOD PRESSURE: 111 MMHG

## 2021-09-09 DIAGNOSIS — K08.89: Primary | ICD-10-CM

## 2021-09-09 DIAGNOSIS — K21.9: ICD-10-CM

## 2021-09-09 DIAGNOSIS — F17.200: ICD-10-CM

## 2021-09-09 PROCEDURE — 99283 EMERGENCY DEPT VISIT LOW MDM: CPT

## 2021-09-10 ENCOUNTER — HOSPITAL ENCOUNTER (EMERGENCY)
Dept: HOSPITAL 8 - ED | Age: 26
Discharge: HOME | End: 2021-09-10
Payer: MEDICAID

## 2021-09-10 ENCOUNTER — HOSPITAL ENCOUNTER (EMERGENCY)
Facility: MEDICAL CENTER | Age: 26
End: 2021-09-10
Payer: COMMERCIAL

## 2021-09-10 VITALS — DIASTOLIC BLOOD PRESSURE: 78 MMHG | SYSTOLIC BLOOD PRESSURE: 127 MMHG

## 2021-09-10 VITALS
OXYGEN SATURATION: 98 % | RESPIRATION RATE: 16 BRPM | SYSTOLIC BLOOD PRESSURE: 152 MMHG | TEMPERATURE: 98 F | HEART RATE: 55 BPM | BODY MASS INDEX: 25.12 KG/M2 | HEIGHT: 77 IN | DIASTOLIC BLOOD PRESSURE: 81 MMHG | WEIGHT: 212.74 LBS

## 2021-09-10 VITALS — BODY MASS INDEX: 25.59 KG/M2 | WEIGHT: 216.71 LBS | HEIGHT: 77 IN

## 2021-09-10 DIAGNOSIS — F17.200: ICD-10-CM

## 2021-09-10 DIAGNOSIS — K04.7: Primary | ICD-10-CM

## 2021-09-10 PROCEDURE — 302449 STATCHG TRIAGE ONLY (STATISTIC)

## 2021-09-10 PROCEDURE — 99283 EMERGENCY DEPT VISIT LOW MDM: CPT

## 2021-09-10 ASSESSMENT — FIBROSIS 4 INDEX: FIB4 SCORE: 0.79

## 2021-09-10 NOTE — ED NOTES
Patient vital signs rechecked and documented per [facility]. Patient denies any new needs at this time.  Patient updated on wait times, thanked for patience. Pt informed to alert triage tech or triage RN with any needs and/or changes in condition; patient verbalized understanding.

## 2021-09-10 NOTE — ED NOTES
Patient called multiple times, not in lounge, not in senior lounge, not in lobby bathroom, will dismiss pt from [facility]

## 2021-09-10 NOTE — ED TRIAGE NOTES
"Chief Complaint   Patient presents with   • Dental Pain   • Headache     PT reports 2days of Dental pain getting worse lower left and now with HA.     Blood Pressure 110/79   Pulse (Abnormal) 55   Temperature 36.4 °C (97.5 °F) (Temporal)   Respiration 16   Height 1.956 m (6' 5\")   Weight 96.5 kg (212 lb 11.9 oz)   Oxygen Saturation 99%   Body Mass Index 25.23 kg/m²     "

## 2021-09-10 NOTE — ED NOTES
Patient called to be roomed. Patient not in the lobby. Will call patient again in 5 minutes. If no response, patient to be d/c'd.

## 2021-09-10 NOTE — NUR
PT TO ROOM 20 W/ C/O L LOWER DENTAL PAIN. PT STATES HE HAS A BROKEN TOOTH. 
STATES PAIN STARTED YESTERDAY MORNING. PT RESTING IN EYE CHAIR. NADREECE. KRISTIS. 
MEDICATED PER MAR.

## 2024-10-29 ENCOUNTER — HOSPITAL ENCOUNTER (EMERGENCY)
Facility: MEDICAL CENTER | Age: 29
End: 2024-10-29
Attending: EMERGENCY MEDICINE

## 2024-10-29 ENCOUNTER — PHARMACY VISIT (OUTPATIENT)
Dept: PHARMACY | Facility: MEDICAL CENTER | Age: 29
End: 2024-10-29
Payer: COMMERCIAL

## 2024-10-29 VITALS
RESPIRATION RATE: 16 BRPM | OXYGEN SATURATION: 95 % | DIASTOLIC BLOOD PRESSURE: 70 MMHG | HEIGHT: 77 IN | HEART RATE: 82 BPM | TEMPERATURE: 99.7 F | SYSTOLIC BLOOD PRESSURE: 115 MMHG | BODY MASS INDEX: 28.93 KG/M2 | WEIGHT: 245 LBS

## 2024-10-29 DIAGNOSIS — R19.7 NAUSEA VOMITING AND DIARRHEA: ICD-10-CM

## 2024-10-29 DIAGNOSIS — R45.851 SUICIDAL IDEATION: ICD-10-CM

## 2024-10-29 DIAGNOSIS — R11.2 NAUSEA VOMITING AND DIARRHEA: ICD-10-CM

## 2024-10-29 LAB
ALBUMIN SERPL BCP-MCNC: 4.2 G/DL (ref 3.2–4.9)
ALBUMIN/GLOB SERPL: 1.4 G/DL
ALP SERPL-CCNC: 61 U/L (ref 30–99)
ALT SERPL-CCNC: 18 U/L (ref 2–50)
AMPHET UR QL SCN: NEGATIVE
ANION GAP SERPL CALC-SCNC: 10 MMOL/L (ref 7–16)
AST SERPL-CCNC: 20 U/L (ref 12–45)
BARBITURATES UR QL SCN: NEGATIVE
BASOPHILS # BLD AUTO: 0.3 % (ref 0–1.8)
BASOPHILS # BLD: 0.05 K/UL (ref 0–0.12)
BENZODIAZ UR QL SCN: NEGATIVE
BILIRUB SERPL-MCNC: 1 MG/DL (ref 0.1–1.5)
BUN SERPL-MCNC: 10 MG/DL (ref 8–22)
BZE UR QL SCN: NEGATIVE
CALCIUM ALBUM COR SERPL-MCNC: 8.7 MG/DL (ref 8.5–10.5)
CALCIUM SERPL-MCNC: 8.9 MG/DL (ref 8.5–10.5)
CANNABINOIDS UR QL SCN: NEGATIVE
CHLORIDE SERPL-SCNC: 106 MMOL/L (ref 96–112)
CO2 SERPL-SCNC: 24 MMOL/L (ref 20–33)
CREAT SERPL-MCNC: 0.96 MG/DL (ref 0.5–1.4)
EKG IMPRESSION: NORMAL
EOSINOPHIL # BLD AUTO: 0.05 K/UL (ref 0–0.51)
EOSINOPHIL NFR BLD: 0.3 % (ref 0–6.9)
ERYTHROCYTE [DISTWIDTH] IN BLOOD BY AUTOMATED COUNT: 41 FL (ref 35.9–50)
FENTANYL UR QL: NEGATIVE
FLUAV RNA SPEC QL NAA+PROBE: NEGATIVE
FLUBV RNA SPEC QL NAA+PROBE: NEGATIVE
GFR SERPLBLD CREATININE-BSD FMLA CKD-EPI: 109 ML/MIN/1.73 M 2
GLOBULIN SER CALC-MCNC: 3.1 G/DL (ref 1.9–3.5)
GLUCOSE SERPL-MCNC: 116 MG/DL (ref 65–99)
HCT VFR BLD AUTO: 47.1 % (ref 42–52)
HGB BLD-MCNC: 15.6 G/DL (ref 14–18)
IMM GRANULOCYTES # BLD AUTO: 0.08 K/UL (ref 0–0.11)
IMM GRANULOCYTES NFR BLD AUTO: 0.5 % (ref 0–0.9)
LYMPHOCYTES # BLD AUTO: 0.63 K/UL (ref 1–4.8)
LYMPHOCYTES NFR BLD: 3.7 % (ref 22–41)
MCH RBC QN AUTO: 30.1 PG (ref 27–33)
MCHC RBC AUTO-ENTMCNC: 33.1 G/DL (ref 32.3–36.5)
MCV RBC AUTO: 90.8 FL (ref 81.4–97.8)
METHADONE UR QL SCN: NEGATIVE
MONOCYTES # BLD AUTO: 0.54 K/UL (ref 0–0.85)
MONOCYTES NFR BLD AUTO: 3.2 % (ref 0–13.4)
NEUTROPHILS # BLD AUTO: 15.68 K/UL (ref 1.82–7.42)
NEUTROPHILS NFR BLD: 92 % (ref 44–72)
NRBC # BLD AUTO: 0 K/UL
NRBC BLD-RTO: 0 /100 WBC (ref 0–0.2)
OPIATES UR QL SCN: NEGATIVE
OXYCODONE UR QL SCN: NEGATIVE
PCP UR QL SCN: NEGATIVE
PLATELET # BLD AUTO: 248 K/UL (ref 164–446)
PMV BLD AUTO: 10.6 FL (ref 9–12.9)
POC BREATHALIZER: 0 PERCENT (ref 0–0.01)
POTASSIUM SERPL-SCNC: 3.9 MMOL/L (ref 3.6–5.5)
PROPOXYPH UR QL SCN: NEGATIVE
PROT SERPL-MCNC: 7.3 G/DL (ref 6–8.2)
RBC # BLD AUTO: 5.19 M/UL (ref 4.7–6.1)
RSV RNA SPEC QL NAA+PROBE: NEGATIVE
SARS-COV-2 RNA RESP QL NAA+PROBE: NOTDETECTED
SODIUM SERPL-SCNC: 140 MMOL/L (ref 135–145)
SPECIMEN SOURCE: NORMAL
TSH SERPL DL<=0.005 MIU/L-ACNC: 0.83 UIU/ML (ref 0.38–5.33)
VIT B12 SERPL-MCNC: 570 PG/ML (ref 211–911)
WBC # BLD AUTO: 17 K/UL (ref 4.8–10.8)

## 2024-10-29 PROCEDURE — RXMED WILLOW AMBULATORY MEDICATION CHARGE: Performed by: EMERGENCY MEDICINE

## 2024-10-29 PROCEDURE — 302970 POC BREATHALIZER

## 2024-10-29 PROCEDURE — 99285 EMERGENCY DEPT VISIT HI MDM: CPT

## 2024-10-29 PROCEDURE — 90791 PSYCH DIAGNOSTIC EVALUATION: CPT

## 2024-10-29 PROCEDURE — 85025 COMPLETE CBC W/AUTO DIFF WBC: CPT

## 2024-10-29 PROCEDURE — 700111 HCHG RX REV CODE 636 W/ 250 OVERRIDE (IP): Mod: UD | Performed by: EMERGENCY MEDICINE

## 2024-10-29 PROCEDURE — 36415 COLL VENOUS BLD VENIPUNCTURE: CPT

## 2024-10-29 PROCEDURE — 80053 COMPREHEN METABOLIC PANEL: CPT

## 2024-10-29 PROCEDURE — 80307 DRUG TEST PRSMV CHEM ANLYZR: CPT

## 2024-10-29 PROCEDURE — 82607 VITAMIN B-12: CPT

## 2024-10-29 PROCEDURE — A9270 NON-COVERED ITEM OR SERVICE: HCPCS | Performed by: EMERGENCY MEDICINE

## 2024-10-29 PROCEDURE — 0241U HCHG SARS-COV-2 COVID-19 NFCT DS RESP RNA 4 TRGT MIC: CPT

## 2024-10-29 PROCEDURE — 700102 HCHG RX REV CODE 250 W/ 637 OVERRIDE(OP): Performed by: EMERGENCY MEDICINE

## 2024-10-29 PROCEDURE — 93005 ELECTROCARDIOGRAM TRACING: CPT

## 2024-10-29 PROCEDURE — 84443 ASSAY THYROID STIM HORMONE: CPT

## 2024-10-29 RX ORDER — ACETAMINOPHEN 500 MG
1000 TABLET ORAL EVERY 8 HOURS PRN
Status: DISCONTINUED | OUTPATIENT
Start: 2024-10-29 | End: 2024-10-30 | Stop reason: HOSPADM

## 2024-10-29 RX ORDER — FAMOTIDINE 20 MG/1
20 TABLET, FILM COATED ORAL 2 TIMES DAILY
Status: DISCONTINUED | OUTPATIENT
Start: 2024-10-29 | End: 2024-10-30 | Stop reason: HOSPADM

## 2024-10-29 RX ORDER — HYDROXYZINE HYDROCHLORIDE 25 MG/1
25 TABLET, FILM COATED ORAL 3 TIMES DAILY PRN
Status: DISCONTINUED | OUTPATIENT
Start: 2024-10-29 | End: 2024-10-30 | Stop reason: HOSPADM

## 2024-10-29 RX ORDER — ESCITALOPRAM OXALATE 10 MG/1
10 TABLET ORAL DAILY
Status: DISCONTINUED | OUTPATIENT
Start: 2024-10-29 | End: 2024-10-29

## 2024-10-29 RX ORDER — HALOPERIDOL 5 MG/1
5 TABLET ORAL EVERY 6 HOURS PRN
Status: DISCONTINUED | OUTPATIENT
Start: 2024-10-29 | End: 2024-10-30 | Stop reason: HOSPADM

## 2024-10-29 RX ORDER — ONDANSETRON 4 MG/1
4 TABLET, ORALLY DISINTEGRATING ORAL ONCE
Status: COMPLETED | OUTPATIENT
Start: 2024-10-29 | End: 2024-10-29

## 2024-10-29 RX ORDER — ALUMINA, MAGNESIA, AND SIMETHICONE 2400; 2400; 240 MG/30ML; MG/30ML; MG/30ML
20 SUSPENSION ORAL ONCE
Status: COMPLETED | OUTPATIENT
Start: 2024-10-29 | End: 2024-10-29

## 2024-10-29 RX ORDER — ONDANSETRON 4 MG/1
4 TABLET, ORALLY DISINTEGRATING ORAL EVERY 8 HOURS PRN
Status: DISCONTINUED | OUTPATIENT
Start: 2024-10-29 | End: 2024-10-30 | Stop reason: HOSPADM

## 2024-10-29 RX ORDER — ONDANSETRON 4 MG/1
4 TABLET, ORALLY DISINTEGRATING ORAL EVERY 8 HOURS PRN
Qty: 10 TABLET | Refills: 0 | Status: SHIPPED | OUTPATIENT
Start: 2024-10-29

## 2024-10-29 RX ORDER — ESCITALOPRAM OXALATE 10 MG/1
5 TABLET ORAL DAILY
Status: DISCONTINUED | OUTPATIENT
Start: 2024-10-30 | End: 2024-10-30 | Stop reason: HOSPADM

## 2024-10-29 RX ORDER — ACETAMINOPHEN 500 MG
1000 TABLET ORAL ONCE
Status: COMPLETED | OUTPATIENT
Start: 2024-10-29 | End: 2024-10-29

## 2024-10-29 RX ORDER — FAMOTIDINE 20 MG/1
20 TABLET, FILM COATED ORAL ONCE
Status: COMPLETED | OUTPATIENT
Start: 2024-10-29 | End: 2024-10-29

## 2024-10-29 RX ADMIN — FAMOTIDINE 20 MG: 20 TABLET, FILM COATED ORAL at 05:48

## 2024-10-29 RX ADMIN — FAMOTIDINE 20 MG: 20 TABLET, FILM COATED ORAL at 03:56

## 2024-10-29 RX ADMIN — FAMOTIDINE 20 MG: 20 TABLET, FILM COATED ORAL at 18:00

## 2024-10-29 RX ADMIN — ONDANSETRON 4 MG: 4 TABLET, ORALLY DISINTEGRATING ORAL at 03:50

## 2024-10-29 RX ADMIN — ALUMINUM HYDROXIDE, MAGNESIUM HYDROXIDE, AND DIMETHICONE 20 ML: 400; 400; 40 SUSPENSION ORAL at 03:57

## 2024-10-29 RX ADMIN — ACETAMINOPHEN 1000 MG: 500 TABLET ORAL at 03:57

## 2024-10-29 ASSESSMENT — ENCOUNTER SYMPTOMS
DIZZINESS: 0
NERVOUS/ANXIOUS: 0
ABDOMINAL PAIN: 1
DEPRESSION: 1
MEMORY LOSS: 0
INSOMNIA: 0
NAUSEA: 1
VOMITING: 1
DIARRHEA: 1
HALLUCINATIONS: 0
HEADACHES: 0

## 2024-10-29 ASSESSMENT — LIFESTYLE VARIABLES: SUBSTANCE_ABUSE: 0
